# Patient Record
Sex: MALE | Race: WHITE
[De-identification: names, ages, dates, MRNs, and addresses within clinical notes are randomized per-mention and may not be internally consistent; named-entity substitution may affect disease eponyms.]

---

## 2018-09-24 ENCOUNTER — HOSPITAL ENCOUNTER (EMERGENCY)
Dept: HOSPITAL 52 - LL.ED | Age: 53
Discharge: HOME | End: 2018-09-24
Payer: MEDICAID

## 2018-09-24 VITALS — SYSTOLIC BLOOD PRESSURE: 113 MMHG | DIASTOLIC BLOOD PRESSURE: 84 MMHG

## 2018-09-24 DIAGNOSIS — F17.210: ICD-10-CM

## 2018-09-24 DIAGNOSIS — M15.0: ICD-10-CM

## 2018-09-24 DIAGNOSIS — J43.1: ICD-10-CM

## 2018-09-24 DIAGNOSIS — Z79.899: ICD-10-CM

## 2018-09-24 DIAGNOSIS — K21.9: ICD-10-CM

## 2018-09-24 DIAGNOSIS — Z99.81: ICD-10-CM

## 2018-09-24 DIAGNOSIS — F32.9: ICD-10-CM

## 2018-09-24 DIAGNOSIS — R55: ICD-10-CM

## 2018-09-24 DIAGNOSIS — F41.8: ICD-10-CM

## 2018-09-24 DIAGNOSIS — E78.00: ICD-10-CM

## 2018-09-24 DIAGNOSIS — I10: ICD-10-CM

## 2018-09-24 DIAGNOSIS — R41.0: Primary | ICD-10-CM

## 2018-09-24 DIAGNOSIS — Z71.6: ICD-10-CM

## 2018-09-24 DIAGNOSIS — E03.9: ICD-10-CM

## 2018-09-24 DIAGNOSIS — E78.5: ICD-10-CM

## 2018-09-24 DIAGNOSIS — F41.9: ICD-10-CM

## 2018-09-24 LAB
CHLORIDE SERPL-SCNC: 105 MMOL/L (ref 98–107)
SODIUM SERPL-SCNC: 140 MMOL/L (ref 136–145)

## 2018-09-24 PROCEDURE — G0480 DRUG TEST DEF 1-7 CLASSES: HCPCS

## 2019-01-12 ENCOUNTER — HOSPITAL ENCOUNTER (INPATIENT)
Dept: HOSPITAL 52 - LL.ED | Age: 54
LOS: 3 days | Discharge: PSYCHIATRIC OR SUBSTANCE USE FACILITY | DRG: 880 | End: 2019-01-15
Attending: FAMILY MEDICINE | Admitting: FAMILY MEDICINE
Payer: MEDICAID

## 2019-01-12 DIAGNOSIS — F41.8: Primary | ICD-10-CM

## 2019-01-12 DIAGNOSIS — E78.5: ICD-10-CM

## 2019-01-12 DIAGNOSIS — M54.2: ICD-10-CM

## 2019-01-12 DIAGNOSIS — E66.9: ICD-10-CM

## 2019-01-12 DIAGNOSIS — G47.00: ICD-10-CM

## 2019-01-12 DIAGNOSIS — G89.29: ICD-10-CM

## 2019-01-12 DIAGNOSIS — E87.6: ICD-10-CM

## 2019-01-12 DIAGNOSIS — Z79.899: ICD-10-CM

## 2019-01-12 DIAGNOSIS — K21.9: ICD-10-CM

## 2019-01-12 DIAGNOSIS — Z81.8: ICD-10-CM

## 2019-01-12 DIAGNOSIS — E78.00: ICD-10-CM

## 2019-01-12 DIAGNOSIS — F10.21: ICD-10-CM

## 2019-01-12 DIAGNOSIS — L40.9: ICD-10-CM

## 2019-01-12 DIAGNOSIS — F17.210: ICD-10-CM

## 2019-01-12 DIAGNOSIS — I10: ICD-10-CM

## 2019-01-12 DIAGNOSIS — E03.9: ICD-10-CM

## 2019-01-12 DIAGNOSIS — J43.1: ICD-10-CM

## 2019-01-12 DIAGNOSIS — Z91.5: ICD-10-CM

## 2019-01-12 DIAGNOSIS — M54.9: ICD-10-CM

## 2019-01-12 DIAGNOSIS — B19.10: ICD-10-CM

## 2019-01-12 DIAGNOSIS — R45.851: ICD-10-CM

## 2019-01-12 LAB
CHLORIDE SERPL-SCNC: 100 MMOL/L (ref 98–107)
SODIUM SERPL-SCNC: 136 MMOL/L (ref 136–145)

## 2019-01-12 PROCEDURE — 83605 ASSAY OF LACTIC ACID: CPT

## 2019-01-12 PROCEDURE — 71046 X-RAY EXAM CHEST 2 VIEWS: CPT

## 2019-01-12 PROCEDURE — 99285 EMERGENCY DEPT VISIT HI MDM: CPT

## 2019-01-12 PROCEDURE — 36415 COLL VENOUS BLD VENIPUNCTURE: CPT

## 2019-01-12 PROCEDURE — 96361 HYDRATE IV INFUSION ADD-ON: CPT

## 2019-01-12 PROCEDURE — 83735 ASSAY OF MAGNESIUM: CPT

## 2019-01-12 PROCEDURE — 81001 URINALYSIS AUTO W/SCOPE: CPT

## 2019-01-12 PROCEDURE — 80305 DRUG TEST PRSMV DIR OPT OBS: CPT

## 2019-01-12 PROCEDURE — 85025 COMPLETE CBC W/AUTO DIFF WBC: CPT

## 2019-01-12 PROCEDURE — 84443 ASSAY THYROID STIM HORMONE: CPT

## 2019-01-12 PROCEDURE — 82140 ASSAY OF AMMONIA: CPT

## 2019-01-12 PROCEDURE — 80053 COMPREHEN METABOLIC PANEL: CPT

## 2019-01-12 PROCEDURE — G0480 DRUG TEST DEF 1-7 CLASSES: HCPCS

## 2019-01-12 PROCEDURE — C9113 INJ PANTOPRAZOLE SODIUM, VIA: HCPCS

## 2019-01-12 RX ADMIN — MOMETASONE FUROATE AND FORMOTEROL FUMARATE DIHYDRATE SCH PUFF: 200; 5 AEROSOL RESPIRATORY (INHALATION) at 19:36

## 2019-01-12 RX ADMIN — Medication PRN ML: at 16:32

## 2019-01-12 RX ADMIN — LITHIUM CARBONATE SCH MG: 450 TABLET, EXTENDED RELEASE ORAL at 19:39

## 2019-01-12 RX ADMIN — ONDANSETRON PRN MG: 4 TABLET, ORALLY DISINTEGRATING ORAL at 19:43

## 2019-01-12 RX ADMIN — POTASSIUM CHLORIDE SCH MEQ: 1500 TABLET, EXTENDED RELEASE ORAL at 17:58

## 2019-01-13 LAB
CHLORIDE SERPL-SCNC: 106 MMOL/L (ref 98–107)
SODIUM SERPL-SCNC: 140 MMOL/L (ref 136–145)

## 2019-01-13 RX ADMIN — ONDANSETRON PRN MG: 4 TABLET, ORALLY DISINTEGRATING ORAL at 05:35

## 2019-01-13 RX ADMIN — POTASSIUM CHLORIDE SCH MEQ: 1500 TABLET, EXTENDED RELEASE ORAL at 07:22

## 2019-01-13 RX ADMIN — MOMETASONE FUROATE AND FORMOTEROL FUMARATE DIHYDRATE SCH PUFF: 200; 5 AEROSOL RESPIRATORY (INHALATION) at 19:40

## 2019-01-13 RX ADMIN — TIOTROPIUM BROMIDE SCH DOSE: 18 CAPSULE ORAL; RESPIRATORY (INHALATION) at 07:23

## 2019-01-13 RX ADMIN — LITHIUM CARBONATE SCH MG: 450 TABLET, EXTENDED RELEASE ORAL at 19:40

## 2019-01-13 RX ADMIN — MOMETASONE FUROATE AND FORMOTEROL FUMARATE DIHYDRATE SCH PUFF: 200; 5 AEROSOL RESPIRATORY (INHALATION) at 07:21

## 2019-01-13 RX ADMIN — POTASSIUM CHLORIDE SCH MEQ: 1500 TABLET, EXTENDED RELEASE ORAL at 17:13

## 2019-01-14 RX ADMIN — MOMETASONE FUROATE AND FORMOTEROL FUMARATE DIHYDRATE SCH PUFF: 200; 5 AEROSOL RESPIRATORY (INHALATION) at 08:43

## 2019-01-14 RX ADMIN — Medication PRN ML: at 16:17

## 2019-01-14 RX ADMIN — TIOTROPIUM BROMIDE SCH DOSE: 18 CAPSULE ORAL; RESPIRATORY (INHALATION) at 08:43

## 2019-01-14 RX ADMIN — LITHIUM CARBONATE SCH MG: 450 TABLET, EXTENDED RELEASE ORAL at 19:51

## 2019-01-14 RX ADMIN — POTASSIUM CHLORIDE SCH MEQ: 1500 TABLET, EXTENDED RELEASE ORAL at 17:09

## 2019-01-14 RX ADMIN — OMEPRAZOLE SCH MG: 20 CAPSULE, DELAYED RELEASE ORAL at 08:34

## 2019-01-14 RX ADMIN — POTASSIUM CHLORIDE SCH MEQ: 1500 TABLET, EXTENDED RELEASE ORAL at 08:35

## 2019-01-14 RX ADMIN — MOMETASONE FUROATE AND FORMOTEROL FUMARATE DIHYDRATE SCH PUFF: 200; 5 AEROSOL RESPIRATORY (INHALATION) at 19:52

## 2019-01-14 RX ADMIN — ONDANSETRON PRN MG: 4 TABLET, ORALLY DISINTEGRATING ORAL at 05:51

## 2019-01-14 RX ADMIN — OMEPRAZOLE SCH MG: 20 CAPSULE, DELAYED RELEASE ORAL at 17:10

## 2019-01-14 RX ADMIN — OMEPRAZOLE SCH: 20 CAPSULE, DELAYED RELEASE ORAL at 00:30

## 2019-01-15 VITALS — DIASTOLIC BLOOD PRESSURE: 78 MMHG | SYSTOLIC BLOOD PRESSURE: 158 MMHG

## 2019-01-15 RX ADMIN — POTASSIUM CHLORIDE SCH MEQ: 1500 TABLET, EXTENDED RELEASE ORAL at 09:01

## 2019-01-15 RX ADMIN — ONDANSETRON PRN MG: 4 TABLET, ORALLY DISINTEGRATING ORAL at 13:17

## 2019-01-15 RX ADMIN — ONDANSETRON PRN MG: 4 TABLET, ORALLY DISINTEGRATING ORAL at 08:03

## 2019-01-15 RX ADMIN — TIOTROPIUM BROMIDE SCH DOSE: 18 CAPSULE ORAL; RESPIRATORY (INHALATION) at 09:02

## 2019-01-15 RX ADMIN — MOMETASONE FUROATE AND FORMOTEROL FUMARATE DIHYDRATE SCH PUFF: 200; 5 AEROSOL RESPIRATORY (INHALATION) at 09:01

## 2019-01-15 RX ADMIN — OMEPRAZOLE SCH MG: 20 CAPSULE, DELAYED RELEASE ORAL at 09:00

## 2019-02-15 ENCOUNTER — HOSPITAL ENCOUNTER (EMERGENCY)
Dept: HOSPITAL 52 - LL.ED | Age: 54
Discharge: HOME | End: 2019-02-15
Payer: MEDICARE

## 2019-02-15 VITALS — SYSTOLIC BLOOD PRESSURE: 148 MMHG | DIASTOLIC BLOOD PRESSURE: 87 MMHG

## 2019-02-15 DIAGNOSIS — K29.70: Primary | ICD-10-CM

## 2019-02-15 DIAGNOSIS — Z79.899: ICD-10-CM

## 2019-02-15 LAB
CHLORIDE SERPL-SCNC: 102 MMOL/L (ref 98–107)
SODIUM SERPL-SCNC: 139 MMOL/L (ref 136–145)

## 2019-05-08 ENCOUNTER — HOSPITAL ENCOUNTER (EMERGENCY)
Dept: HOSPITAL 52 - LL.ED | Age: 54
Discharge: HOME | End: 2019-05-08
Payer: MEDICARE

## 2019-05-08 VITALS — SYSTOLIC BLOOD PRESSURE: 138 MMHG | DIASTOLIC BLOOD PRESSURE: 72 MMHG

## 2019-05-08 DIAGNOSIS — K21.9: ICD-10-CM

## 2019-05-08 DIAGNOSIS — E03.9: ICD-10-CM

## 2019-05-08 DIAGNOSIS — F41.9: ICD-10-CM

## 2019-05-08 DIAGNOSIS — R79.89: ICD-10-CM

## 2019-05-08 DIAGNOSIS — Z79.899: ICD-10-CM

## 2019-05-08 DIAGNOSIS — K52.9: Primary | ICD-10-CM

## 2019-05-08 DIAGNOSIS — I10: ICD-10-CM

## 2019-05-08 DIAGNOSIS — E78.00: ICD-10-CM

## 2019-05-08 DIAGNOSIS — F32.9: ICD-10-CM

## 2019-05-08 DIAGNOSIS — F17.200: ICD-10-CM

## 2019-05-08 DIAGNOSIS — J44.9: ICD-10-CM

## 2019-05-08 LAB
CHLORIDE SERPL-SCNC: 99 MMOL/L (ref 98–107)
SODIUM SERPL-SCNC: 138 MMOL/L (ref 136–145)

## 2019-05-08 PROCEDURE — C9113 INJ PANTOPRAZOLE SODIUM, VIA: HCPCS

## 2019-05-08 RX ADMIN — Medication PRN ML: at 11:26

## 2019-05-08 RX ADMIN — Medication PRN ML: at 10:46

## 2019-08-25 ENCOUNTER — HOSPITAL ENCOUNTER (EMERGENCY)
Dept: HOSPITAL 52 - LL.ED | Age: 54
Discharge: HOME | End: 2019-08-25
Payer: MEDICARE

## 2019-08-25 VITALS — DIASTOLIC BLOOD PRESSURE: 83 MMHG | SYSTOLIC BLOOD PRESSURE: 141 MMHG

## 2019-08-25 DIAGNOSIS — S50.821A: Primary | ICD-10-CM

## 2019-08-25 DIAGNOSIS — F41.9: ICD-10-CM

## 2019-08-25 DIAGNOSIS — F17.210: ICD-10-CM

## 2019-08-25 DIAGNOSIS — K21.9: ICD-10-CM

## 2019-08-25 DIAGNOSIS — E78.00: ICD-10-CM

## 2019-08-25 DIAGNOSIS — F32.9: ICD-10-CM

## 2019-08-25 DIAGNOSIS — I10: ICD-10-CM

## 2019-08-25 DIAGNOSIS — J44.9: ICD-10-CM

## 2019-08-25 DIAGNOSIS — Z79.899: ICD-10-CM

## 2019-08-25 DIAGNOSIS — E03.9: ICD-10-CM
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
## 2019-11-09 ENCOUNTER — HOSPITAL ENCOUNTER (EMERGENCY)
Dept: HOSPITAL 52 - LL.ED | Age: 54
Discharge: HOME | End: 2019-11-09
Payer: MEDICARE

## 2019-11-09 VITALS — HEART RATE: 102 BPM | DIASTOLIC BLOOD PRESSURE: 89 MMHG | SYSTOLIC BLOOD PRESSURE: 129 MMHG

## 2019-11-09 DIAGNOSIS — F17.210: ICD-10-CM

## 2019-11-09 DIAGNOSIS — F41.9: ICD-10-CM
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
## 2019-11-09 DIAGNOSIS — J44.9: ICD-10-CM

## 2019-11-09 DIAGNOSIS — Z79.899: ICD-10-CM
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
## 2019-11-09 DIAGNOSIS — F10.220: ICD-10-CM

## 2019-11-09 DIAGNOSIS — F32.9: ICD-10-CM

## 2019-11-09 DIAGNOSIS — S39.92XA: ICD-10-CM

## 2019-11-09 DIAGNOSIS — E87.6: ICD-10-CM

## 2019-11-09 DIAGNOSIS — Z79.890: ICD-10-CM

## 2019-11-09 DIAGNOSIS — I10: ICD-10-CM

## 2019-11-09 DIAGNOSIS — E03.9: ICD-10-CM

## 2019-11-09 DIAGNOSIS — E66.9: ICD-10-CM

## 2019-11-09 DIAGNOSIS — S70.12XA: Primary | ICD-10-CM

## 2019-11-09 DIAGNOSIS — Z79.51: ICD-10-CM

## 2019-11-09 DIAGNOSIS — S00.81XA: ICD-10-CM

## 2019-11-09 DIAGNOSIS — K21.9: ICD-10-CM
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
## 2019-11-09 LAB
CHLORIDE SERPL-SCNC: 108 MMOL/L (ref 98–107)
SODIUM SERPL-SCNC: 146 MMOL/L (ref 136–145)

## 2019-11-09 PROCEDURE — G0480 DRUG TEST DEF 1-7 CLASSES: HCPCS

## 2019-11-09 RX ADMIN — Medication PRN ML: at 03:26

## 2019-11-09 RX ADMIN — POTASSIUM CHLORIDE ONE MEQ: 1500 TABLET, EXTENDED RELEASE ORAL at 03:25

## 2019-11-12 ENCOUNTER — HOSPITAL ENCOUNTER (EMERGENCY)
Dept: HOSPITAL 52 - LL.ED | Age: 54
Discharge: LEFT BEFORE DISCHARGE | End: 2019-11-12
Payer: MEDICARE

## 2019-11-12 VITALS — HEART RATE: 101 BPM | SYSTOLIC BLOOD PRESSURE: 144 MMHG | DIASTOLIC BLOOD PRESSURE: 107 MMHG

## 2019-11-12 DIAGNOSIS — J44.9: ICD-10-CM

## 2019-11-12 DIAGNOSIS — E03.9: ICD-10-CM

## 2019-11-12 DIAGNOSIS — E66.9: ICD-10-CM

## 2019-11-12 DIAGNOSIS — I10: ICD-10-CM

## 2019-11-12 DIAGNOSIS — F41.9: ICD-10-CM

## 2019-11-12 DIAGNOSIS — F10.120: Primary | ICD-10-CM

## 2019-11-12 DIAGNOSIS — K21.9: ICD-10-CM

## 2019-11-12 DIAGNOSIS — E78.00: ICD-10-CM

## 2019-11-12 DIAGNOSIS — F32.9: ICD-10-CM

## 2019-11-12 LAB
BARBITURATES UR QL SCN: NEGATIVE
BENZODIAZ UR QL SCN: NEGATIVE
CHLORIDE SERPL-SCNC: 111 MMOL/L (ref 98–107)
SODIUM SERPL-SCNC: 147 MMOL/L (ref 136–145)
TCA UR-MCNC: NEGATIVE UG/ML
THC UR QL SCN>50 NG/ML: POSITIVE

## 2019-11-12 PROCEDURE — 99282 EMERGENCY DEPT VISIT SF MDM: CPT

## 2019-11-12 PROCEDURE — 99284 EMERGENCY DEPT VISIT MOD MDM: CPT

## 2019-11-12 PROCEDURE — 80053 COMPREHEN METABOLIC PANEL: CPT

## 2019-11-12 PROCEDURE — G0480 DRUG TEST DEF 1-7 CLASSES: HCPCS

## 2019-11-12 PROCEDURE — 36415 COLL VENOUS BLD VENIPUNCTURE: CPT

## 2019-11-12 PROCEDURE — 85025 COMPLETE CBC W/AUTO DIFF WBC: CPT

## 2019-11-12 PROCEDURE — 80305 DRUG TEST PRSMV DIR OPT OBS: CPT

## 2019-11-13 ENCOUNTER — HOSPITAL ENCOUNTER (OUTPATIENT)
Dept: HOSPITAL 52 - LL.CLIN | Age: 54
End: 2019-11-13
Payer: MEDICARE

## 2019-11-13 DIAGNOSIS — M25.861: ICD-10-CM

## 2019-11-13 DIAGNOSIS — Z01.818: Primary | ICD-10-CM

## 2019-11-13 DIAGNOSIS — M25.569: ICD-10-CM

## 2019-11-13 DIAGNOSIS — M25.862: ICD-10-CM

## 2019-11-13 DIAGNOSIS — M76.899: ICD-10-CM

## 2019-12-09 ENCOUNTER — HOSPITAL ENCOUNTER (INPATIENT)
Dept: HOSPITAL 52 - LL.MS | Age: 54
LOS: 3 days | Discharge: HOME | DRG: 897 | End: 2019-12-12
Attending: FAMILY MEDICINE | Admitting: FAMILY MEDICINE
Payer: MEDICARE

## 2019-12-09 DIAGNOSIS — K21.9: ICD-10-CM

## 2019-12-09 DIAGNOSIS — I10: ICD-10-CM

## 2019-12-09 DIAGNOSIS — M17.0: ICD-10-CM

## 2019-12-09 DIAGNOSIS — F10.231: ICD-10-CM

## 2019-12-09 DIAGNOSIS — Z79.899: ICD-10-CM

## 2019-12-09 DIAGNOSIS — E03.9: ICD-10-CM

## 2019-12-09 DIAGNOSIS — E78.00: ICD-10-CM

## 2019-12-09 DIAGNOSIS — E66.9: ICD-10-CM

## 2019-12-09 DIAGNOSIS — F10.221: Primary | ICD-10-CM

## 2019-12-09 DIAGNOSIS — E86.0: ICD-10-CM

## 2019-12-09 DIAGNOSIS — F41.8: ICD-10-CM

## 2019-12-09 DIAGNOSIS — E78.2: ICD-10-CM

## 2019-12-09 DIAGNOSIS — J44.9: ICD-10-CM

## 2019-12-09 DIAGNOSIS — F17.200: ICD-10-CM

## 2019-12-09 DIAGNOSIS — F31.32: ICD-10-CM

## 2019-12-09 DIAGNOSIS — F10.239: ICD-10-CM

## 2019-12-09 DIAGNOSIS — Z71.6: ICD-10-CM

## 2019-12-09 LAB
CHLORIDE SERPL-SCNC: 109 MMOL/L (ref 98–107)
SODIUM SERPL-SCNC: 147 MMOL/L (ref 136–145)

## 2019-12-09 PROCEDURE — C9113 INJ PANTOPRAZOLE SODIUM, VIA: HCPCS

## 2019-12-09 PROCEDURE — G0480 DRUG TEST DEF 1-7 CLASSES: HCPCS

## 2019-12-09 RX ADMIN — HYDROXYZINE HYDROCHLORIDE PRN MG: 50 INJECTION, SOLUTION INTRAMUSCULAR at 15:53

## 2019-12-09 RX ADMIN — POTASSIUM CHLORIDE SCH MEQ: 1500 TABLET, EXTENDED RELEASE ORAL at 17:11

## 2019-12-09 RX ADMIN — LORAZEPAM PRN MG: 2 INJECTION INTRAMUSCULAR; INTRAVENOUS at 13:22

## 2019-12-10 LAB
CHLORIDE SERPL-SCNC: 106 MMOL/L (ref 98–107)
SODIUM SERPL-SCNC: 142 MMOL/L (ref 136–145)

## 2019-12-10 RX ADMIN — HYDROXYZINE HYDROCHLORIDE PRN MG: 50 INJECTION, SOLUTION INTRAMUSCULAR at 19:37

## 2019-12-10 RX ADMIN — POTASSIUM CHLORIDE SCH MEQ: 1500 TABLET, EXTENDED RELEASE ORAL at 07:30

## 2019-12-10 RX ADMIN — POTASSIUM CHLORIDE SCH MEQ: 1500 TABLET, EXTENDED RELEASE ORAL at 17:07

## 2019-12-10 RX ADMIN — LORAZEPAM PRN MG: 2 INJECTION INTRAMUSCULAR; INTRAVENOUS at 21:02

## 2019-12-10 RX ADMIN — Medication SCH ML: at 22:06

## 2019-12-11 LAB
CHLORIDE SERPL-SCNC: 105 MMOL/L (ref 98–107)
SODIUM SERPL-SCNC: 141 MMOL/L (ref 136–145)

## 2019-12-11 RX ADMIN — Medication SCH ML: at 19:30

## 2019-12-11 RX ADMIN — NEOMYCIN AND POLYMYXIN B SULFATES AND BACITRACIN ZINC SCH EACH: 400; 3.5; 5 OINTMENT TOPICAL at 19:31

## 2019-12-11 RX ADMIN — POTASSIUM CHLORIDE SCH MEQ: 1500 TABLET, EXTENDED RELEASE ORAL at 07:14

## 2019-12-11 RX ADMIN — Medication SCH ML: at 07:16

## 2019-12-11 RX ADMIN — POTASSIUM CHLORIDE SCH: 1500 TABLET, EXTENDED RELEASE ORAL at 17:21

## 2019-12-11 RX ADMIN — Medication SCH EA: at 07:00

## 2019-12-12 VITALS — HEART RATE: 68 BPM | SYSTOLIC BLOOD PRESSURE: 121 MMHG | DIASTOLIC BLOOD PRESSURE: 82 MMHG

## 2019-12-12 LAB
CHLORIDE SERPL-SCNC: 106 MMOL/L (ref 98–107)
SODIUM SERPL-SCNC: 142 MMOL/L (ref 136–145)

## 2019-12-12 RX ADMIN — NEOMYCIN AND POLYMYXIN B SULFATES AND BACITRACIN ZINC SCH EACH: 400; 3.5; 5 OINTMENT TOPICAL at 07:34

## 2019-12-12 RX ADMIN — Medication SCH EA: at 07:33

## 2019-12-12 RX ADMIN — Medication SCH ML: at 07:33

## 2020-01-03 ENCOUNTER — HOSPITAL ENCOUNTER (INPATIENT)
Dept: HOSPITAL 52 - LL.MS | Age: 55
LOS: 3 days | Discharge: NURSING, REHAB OR LONG-TERM CARE | DRG: 897 | End: 2020-01-06
Attending: FAMILY MEDICINE | Admitting: FAMILY MEDICINE
Payer: MEDICARE

## 2020-01-03 DIAGNOSIS — Z51.5: ICD-10-CM

## 2020-01-03 DIAGNOSIS — F10.231: Primary | ICD-10-CM

## 2020-01-03 DIAGNOSIS — K21.9: ICD-10-CM

## 2020-01-03 DIAGNOSIS — F10.229: ICD-10-CM

## 2020-01-03 DIAGNOSIS — I10: ICD-10-CM

## 2020-01-03 DIAGNOSIS — F41.8: ICD-10-CM

## 2020-01-03 DIAGNOSIS — F10.239: ICD-10-CM

## 2020-01-03 DIAGNOSIS — E03.9: ICD-10-CM

## 2020-01-03 DIAGNOSIS — F31.32: ICD-10-CM

## 2020-01-03 DIAGNOSIS — E78.2: ICD-10-CM

## 2020-01-03 DIAGNOSIS — Z71.6: ICD-10-CM

## 2020-01-03 DIAGNOSIS — B37.0: ICD-10-CM

## 2020-01-03 DIAGNOSIS — M17.0: ICD-10-CM

## 2020-01-03 DIAGNOSIS — E86.0: ICD-10-CM

## 2020-01-03 DIAGNOSIS — M05.79: ICD-10-CM

## 2020-01-03 DIAGNOSIS — J44.1: ICD-10-CM

## 2020-01-03 LAB
CHLORIDE SERPL-SCNC: 107 MMOL/L (ref 98–107)
SODIUM SERPL-SCNC: 143 MMOL/L (ref 136–145)

## 2020-01-03 PROCEDURE — G0480 DRUG TEST DEF 1-7 CLASSES: HCPCS

## 2020-01-03 RX ADMIN — LORAZEPAM PRN MG: 2 INJECTION INTRAMUSCULAR; INTRAVENOUS at 21:48

## 2020-01-03 RX ADMIN — LORAZEPAM PRN MG: 2 INJECTION INTRAMUSCULAR; INTRAVENOUS at 17:00

## 2020-01-03 RX ADMIN — Medication SCH: at 17:15

## 2020-01-03 RX ADMIN — NYSTATIN SCH ML: 500000 SUSPENSION ORAL at 19:21

## 2020-01-04 RX ADMIN — AMOXICILLIN AND CLAVULANATE POTASSIUM SCH TAB: 875; 125 TABLET, FILM COATED ORAL at 19:05

## 2020-01-04 RX ADMIN — NYSTATIN SCH ML: 500000 SUSPENSION ORAL at 12:20

## 2020-01-04 RX ADMIN — LORAZEPAM PRN MG: 2 INJECTION INTRAMUSCULAR; INTRAVENOUS at 05:02

## 2020-01-04 RX ADMIN — TIOTROPIUM BROMIDE SCH PUFF: 18 CAPSULE ORAL; RESPIRATORY (INHALATION) at 08:21

## 2020-01-04 RX ADMIN — VITAMIN D, TAB 1000IU (100/BT) SCH MCG: 25 TAB at 08:15

## 2020-01-04 RX ADMIN — ONDANSETRON PRN MG: 4 TABLET, ORALLY DISINTEGRATING ORAL at 05:03

## 2020-01-04 RX ADMIN — ONDANSETRON PRN MG: 4 TABLET, ORALLY DISINTEGRATING ORAL at 19:04

## 2020-01-04 RX ADMIN — OMEPRAZOLE SCH MG: 20 CAPSULE, DELAYED RELEASE ORAL at 08:15

## 2020-01-04 RX ADMIN — LORAZEPAM PRN MG: 2 INJECTION INTRAMUSCULAR; INTRAVENOUS at 23:28

## 2020-01-04 RX ADMIN — MOMETASONE FUROATE AND FORMOTEROL FUMARATE DIHYDRATE SCH PUFF: 200; 5 AEROSOL RESPIRATORY (INHALATION) at 08:20

## 2020-01-04 RX ADMIN — Medication SCH EA: at 17:14

## 2020-01-04 RX ADMIN — LORAZEPAM PRN MG: 2 INJECTION INTRAMUSCULAR; INTRAVENOUS at 12:28

## 2020-01-04 RX ADMIN — NYSTATIN SCH ML: 500000 SUSPENSION ORAL at 15:26

## 2020-01-04 RX ADMIN — NYSTATIN SCH ML: 500000 SUSPENSION ORAL at 19:06

## 2020-01-04 RX ADMIN — NYSTATIN SCH ML: 500000 SUSPENSION ORAL at 08:16

## 2020-01-05 LAB
BARBITURATES UR QL SCN: NEGATIVE
BENZODIAZ UR QL SCN: POSITIVE
CHLORIDE SERPL-SCNC: 107 MMOL/L (ref 98–107)
EDDP UR QL: NEGATIVE
SODIUM SERPL-SCNC: 140 MMOL/L (ref 136–145)
TCA UR-MCNC: NEGATIVE UG/ML
THC UR QL SCN>50 NG/ML: POSITIVE

## 2020-01-05 RX ADMIN — VITAMIN D, TAB 1000IU (100/BT) SCH MCG: 25 TAB at 07:54

## 2020-01-05 RX ADMIN — NYSTATIN SCH ML: 500000 SUSPENSION ORAL at 17:19

## 2020-01-05 RX ADMIN — LORAZEPAM PRN MG: 2 INJECTION INTRAMUSCULAR; INTRAVENOUS at 17:21

## 2020-01-05 RX ADMIN — AMOXICILLIN AND CLAVULANATE POTASSIUM SCH TAB: 875; 125 TABLET, FILM COATED ORAL at 07:55

## 2020-01-05 RX ADMIN — OMEPRAZOLE SCH MG: 20 CAPSULE, DELAYED RELEASE ORAL at 07:55

## 2020-01-05 RX ADMIN — AMOXICILLIN AND CLAVULANATE POTASSIUM SCH TAB: 875; 125 TABLET, FILM COATED ORAL at 19:41

## 2020-01-05 RX ADMIN — LORAZEPAM PRN MG: 2 INJECTION INTRAMUSCULAR; INTRAVENOUS at 09:46

## 2020-01-05 RX ADMIN — NYSTATIN SCH ML: 500000 SUSPENSION ORAL at 19:40

## 2020-01-05 RX ADMIN — NYSTATIN SCH ML: 500000 SUSPENSION ORAL at 07:53

## 2020-01-05 RX ADMIN — Medication SCH EA: at 17:19

## 2020-01-05 RX ADMIN — TIOTROPIUM BROMIDE SCH PUFF: 18 CAPSULE ORAL; RESPIRATORY (INHALATION) at 07:53

## 2020-01-05 RX ADMIN — MOMETASONE FUROATE AND FORMOTEROL FUMARATE DIHYDRATE SCH PUFF: 200; 5 AEROSOL RESPIRATORY (INHALATION) at 07:53

## 2020-01-05 RX ADMIN — NYSTATIN SCH ML: 500000 SUSPENSION ORAL at 11:32

## 2020-01-06 VITALS — SYSTOLIC BLOOD PRESSURE: 123 MMHG | DIASTOLIC BLOOD PRESSURE: 77 MMHG | HEART RATE: 75 BPM

## 2020-01-06 RX ADMIN — OMEPRAZOLE SCH MG: 20 CAPSULE, DELAYED RELEASE ORAL at 07:26

## 2020-01-06 RX ADMIN — NYSTATIN SCH ML: 500000 SUSPENSION ORAL at 11:16

## 2020-01-06 RX ADMIN — TIOTROPIUM BROMIDE SCH PUFF: 18 CAPSULE ORAL; RESPIRATORY (INHALATION) at 07:27

## 2020-01-06 RX ADMIN — NYSTATIN SCH ML: 500000 SUSPENSION ORAL at 07:26

## 2020-01-06 RX ADMIN — MOMETASONE FUROATE AND FORMOTEROL FUMARATE DIHYDRATE SCH PUFF: 200; 5 AEROSOL RESPIRATORY (INHALATION) at 07:26

## 2020-01-06 RX ADMIN — AMOXICILLIN AND CLAVULANATE POTASSIUM SCH TAB: 875; 125 TABLET, FILM COATED ORAL at 07:25

## 2020-01-06 RX ADMIN — VITAMIN D, TAB 1000IU (100/BT) SCH MCG: 25 TAB at 07:25

## 2020-01-21 ENCOUNTER — HOSPITAL ENCOUNTER (INPATIENT)
Dept: HOSPITAL 7 - FB.SDS | Age: 55
LOS: 3 days | Discharge: NURSING, REHAB OR LONG-TERM CARE | DRG: 554 | End: 2020-01-24
Payer: MEDICARE

## 2020-01-21 DIAGNOSIS — Z79.51: ICD-10-CM

## 2020-01-21 DIAGNOSIS — E83.42: ICD-10-CM

## 2020-01-21 DIAGNOSIS — F10.20: ICD-10-CM

## 2020-01-21 DIAGNOSIS — F32.9: ICD-10-CM

## 2020-01-21 DIAGNOSIS — F17.210: ICD-10-CM

## 2020-01-21 DIAGNOSIS — Z79.899: ICD-10-CM

## 2020-01-21 DIAGNOSIS — E03.9: ICD-10-CM

## 2020-01-21 DIAGNOSIS — M19.90: ICD-10-CM

## 2020-01-21 DIAGNOSIS — J44.9: ICD-10-CM

## 2020-01-21 DIAGNOSIS — Z79.82: ICD-10-CM

## 2020-01-21 DIAGNOSIS — D62: ICD-10-CM

## 2020-01-21 DIAGNOSIS — F10.21: ICD-10-CM

## 2020-01-21 DIAGNOSIS — E87.6: ICD-10-CM

## 2020-01-21 DIAGNOSIS — K21.9: ICD-10-CM

## 2020-01-21 DIAGNOSIS — F12.10: ICD-10-CM

## 2020-01-21 DIAGNOSIS — I48.91: ICD-10-CM

## 2020-01-21 DIAGNOSIS — E83.39: ICD-10-CM

## 2020-01-21 DIAGNOSIS — M17.12: Primary | ICD-10-CM

## 2020-01-21 DIAGNOSIS — Z79.890: ICD-10-CM

## 2020-01-21 DIAGNOSIS — J40: ICD-10-CM

## 2020-01-21 DIAGNOSIS — I10: ICD-10-CM

## 2020-01-21 DIAGNOSIS — F60.3: ICD-10-CM

## 2020-01-21 PROCEDURE — 94150 VITAL CAPACITY TEST: CPT

## 2020-01-21 PROCEDURE — 86900 BLOOD TYPING SEROLOGIC ABO: CPT

## 2020-01-21 PROCEDURE — 27487 REVISE/REPLACE KNEE JOINT: CPT

## 2020-01-21 PROCEDURE — 85730 THROMBOPLASTIN TIME PARTIAL: CPT

## 2020-01-21 PROCEDURE — 86850 RBC ANTIBODY SCREEN: CPT

## 2020-01-21 PROCEDURE — C1776 JOINT DEVICE (IMPLANTABLE): HCPCS

## 2020-01-21 PROCEDURE — 73560 X-RAY EXAM OF KNEE 1 OR 2: CPT

## 2020-01-21 PROCEDURE — 36415 COLL VENOUS BLD VENIPUNCTURE: CPT

## 2020-01-21 PROCEDURE — 85610 PROTHROMBIN TIME: CPT

## 2020-01-21 PROCEDURE — 86901 BLOOD TYPING SEROLOGIC RH(D): CPT

## 2020-01-21 PROCEDURE — C1713 ANCHOR/SCREW BN/BN,TIS/BN: HCPCS

## 2020-01-21 RX ADMIN — ACETAMINOPHEN SCH MG: 650 TABLET, FILM COATED, EXTENDED RELEASE ORAL at 20:37

## 2020-01-21 RX ADMIN — ACETAMINOPHEN SCH MG: 650 TABLET, FILM COATED, EXTENDED RELEASE ORAL at 17:02

## 2020-01-21 RX ADMIN — ROPIVACAINE HYDROCHLORIDE ONE SYRINGE: 5 INJECTION, SOLUTION EPIDURAL; INFILTRATION; PERINEURAL at 11:42

## 2020-01-21 RX ADMIN — OXYCODONE HYDROCHLORIDE AND ACETAMINOPHEN PRN TAB: 5; 325 TABLET ORAL at 15:50

## 2020-01-21 RX ADMIN — ROPIVACAINE HYDROCHLORIDE ONE SYRINGE: 5 INJECTION, SOLUTION EPIDURAL; INFILTRATION; PERINEURAL at 11:39

## 2020-01-21 RX ADMIN — TRANEXAMIC ACID ONE IRR: 100 INJECTION, SOLUTION INTRAVENOUS at 11:43

## 2020-01-21 RX ADMIN — ACETAMINOPHEN SCH MG: 650 TABLET, FILM COATED, EXTENDED RELEASE ORAL at 13:40

## 2020-01-21 RX ADMIN — TRANEXAMIC ACID ONE IRR: 100 INJECTION, SOLUTION INTRAVENOUS at 11:44

## 2020-01-21 RX ADMIN — OXYCODONE HYDROCHLORIDE AND ACETAMINOPHEN PRN TAB: 5; 325 TABLET ORAL at 20:46

## 2020-01-21 NOTE — PCM.CONS
H&P History of Present Illness





- General


Date of Service: 01/21/20


Admit Problem/Dx: 


 Admission Diagnosis/Problem





Admission Diagnosis/Problem      Knee joint operation








Source of Information: Patient, Old Records


History Limitations: Reports: No Limitations





- History of Present Illness


Initial Comments - Free Text/Narative: 


This is a 54-year-old male patient that Dr. Ureña wanted a consultation regards 

to his chronic medical problems. He has many medical problems including history 

of alcohol dependence, borderline personality, cannabis abuse, smoking, asthma, 

hypertension, A. fib are the most critical once. Patient had a left TKA today. 

He has no concerns. He says his been sober for 2 months currently but he 

continues to smoke. He denies any fevers, chills, chest pain, shortness of 

breath, palpitations. He's had suicidal attempts in the past but he says he 

stable this time on his psych medicines. He does see psychiatry.





  ** BIBI KNEE


Pain Score (Numeric/FACES): 3





- Related Data


Allergies/Adverse Reactions: 


 Allergies











Allergy/AdvReac Type Severity Reaction Status Date / Time


 


No Known Allergies Allergy   Verified 01/21/20 07:47











Home Medications: 


 Home Meds





Acetaminophen [Tylenol Arthritis] 650 mg PO QID 01/20/20 [History]


Budesonide/Formoterol Fumarate [Symbicort 160-4.5 Mcg Inhaler] 2 puff INH DAILY 

01/20/20 [History]


Cholecalciferol (Vitamin D3) [Vitamin D3] 4,000 unit PO DAILY 01/20/20 [History]


Gemfibrozil [Lopid] 600 mg PO DAILY 01/20/20 [History]


Glucosamine/Chondro Jackson A [Glucosamine-Chondroitin Tab] 1 each PO BID 01/20/20 [

History]


Levothyroxine 25 mcg PO ACBREAKFAST 01/20/20 [History]


Methotrexate 20 mg PO WE 01/20/20 [History]


Metoprolol Succinate [Toprol XL] 25 mg PO DAILY 01/20/20 [History]


Nicotine [Nicoderm CQ] 42 mg TD DAILY 01/20/20 [History]


Omeprazole 40 mg PO DAILY 01/20/20 [History]


QUEtiapine [SEROquel] 100 mg PO TID 01/20/20 [History]


Sertraline [Zoloft] 200 mg PO DAILY 01/20/20 [History]


Tiotropium [Spiriva] 18 mcg INH DAILY 01/20/20 [History]


Vitamin B Complex [B Complex] 1 each PO DAILY 01/20/20 [History]


Zolpidem Tartrate [Ambien] 10 mg PO BEDTIME 01/20/20 [History]


atoMOXetine [Strattera] 60 mg PO DAILY 01/20/20 [History]


rOPINIRole [Requip] 1 mg PO BEDTIME 01/20/20 [History]











Past Medical History


Cardiovascular History: Reports: Afib, Hypertension


Other Cardiovascular History: CARDIOVERSION


Respiratory History: Reports: Asthma


Gastrointestinal History: Reports: GERD


Musculoskeletal History: Reports: Arthritis


Psychiatric History: Reports: Addiction, Depression


Other Psychiatric History: ALCOHOL ABUSE, ALCOHOL WITHDRAWAL, CANNABIS ABUSE


Endocrine/Metabolic History: Reports: Hypokalemia, Hypomagnesemia, 

Hypothyroidism


Other Endocrine/Metabolic History: HYPOPHOSPHATEMIA





Social & Family History





- Tobacco Use


Smoking Status *Q: Current Every Day Smoker


Years of Tobacco use: 47


Packs/Tins Daily: 0.5





- Caffeine Use


Caffeine Use: Reports: Coffee, Tea





- Recreational Drug Use


Recreational Drug Use: Yes


Recreational Drug Type: Reports: Marijuana/Hashish


Recreational Drug Use Frequency: Weekly


Recreational Drug Last Use: LAST WEEK





H&P Review of Systems





- Review of Systems:


Review Of Systems: See Below


General: Reports: No Symptoms


HEENT: Reports: No Symptoms


Pulmonary: Reports: No Symptoms


Cardiovascular: Reports: No Symptoms


Gastrointestinal: Reports: No Symptoms


Genitourinary: Reports: No Symptoms


Musculoskeletal: Reports: Joint Pain


Skin: Reports: No Symptoms


Psychiatric: Reports: No Symptoms


Neurological: Reports: No Symptoms


Hematologic/Lymphatic: Reports: No Symptoms


Immunologic: Reports: No Symptoms





Exam





- Exam


Exam: See Below





- Vital Signs


Vital Signs: 


 Last Vital Signs











Temp  98.7 F   01/21/20 11:28


 


Pulse  92   01/21/20 11:56


 


Resp  17   01/21/20 11:56


 


BP  98/62   01/21/20 11:56


 


Pulse Ox  95   01/21/20 11:56











Weight: 154 lb 1.65 oz





- Exam


General: Alert, Oriented, Cooperative


HEENT: Hearing Intact, Posterior Pharynx Clear, TMs Clear


Neck: Supple, Trachea Midline, 2


Lungs: Clear to Auscultation, Normal Respiratory Effort.  No: Crackles, Rales, 

Rhonchi


Cardiovascular: Regular Rate, Regular Rhythm.  No: Systolic Murmur


GI/Abdominal Exam: Normal Bowel Sounds, Soft, Non-Tender, No Organomegaly, No 

Distention, No Abnormal Bruit, No Mass


Extremities: No Pedal Edema


Skin: Warm, Dry, Intact


Neurological: Normal Speech, Normal Tone


Neuro Extensive - Mental Status: Alert, Oriented x3, Normal Mood/Affect, Normal 

Cognition, Memory Intact


Psychiatric: Alert, Normal Affect, Normal Mood





- Patient Data


Lab Results Last 24 hrs: 


 Laboratory Results - last 24 hr











  01/21/20 01/21/20 Range/Units





  07:50 07:50 


 


PT  9.7   (8.7-11.1)  


 


INR  1.00   (0.89-1.13)  


 


APTT  24.5   (24.4-33.2)  SECONDS


 


Blood Type   B POSITIVE  


 


Gel Antibody Screen   Negative  














Sepsis Event Note





- Focused Exam


Vital Signs: 


 Vital Signs











  Temp Temp Pulse Resp BP BP BP


 


 01/21/20 11:56    92  17    98/62


 


 01/21/20 11:50    72  14    99/72


 


 01/21/20 11:45    93  16    92/75


 


 01/21/20 11:42      95/73  


 


 01/21/20 11:40    99  18    95/73 01/21/20 11:39      95/73  


 


 01/21/20 11:35    97  12   93/70  93/70


 


 01/21/20 11:28   98.7 F  105 H  13    93/64


 


 01/21/20 08:02  99.0 F   60  16   135/85 














  Pulse Ox


 


 01/21/20 11:56  95


 


 01/21/20 11:50  95


 


 01/21/20 11:45  95


 


 01/21/20 11:42 


 


 01/21/20 11:40  96


 


 01/21/20 11:39 


 


 01/21/20 11:35  95


 


 01/21/20 11:28  95


 


 01/21/20 08:02  97











Date Exam was Performed: 01/21/20


Time Exam was Performed: 12:58





Consult PN Assessment/Plan


(1) S/P total knee arthroplasty


SNOMED Code(s): 7986326894900, 207104311, 8826436242249


   Code(s): Z96.659 - PRESENCE OF UNSPECIFIED ARTIFICIAL KNEE JOINT   Current 

Visit: Yes   





(2) Atrial fibrillation


SNOMED Code(s): 34819080


   Code(s): I48.91 - UNSPECIFIED ATRIAL FIBRILLATION   Current Visit: Yes   





(3) Hypertension


SNOMED Code(s): 61630433


   Code(s): I10 - ESSENTIAL (PRIMARY) HYPERTENSION   Current Visit: Yes   





(4) History of alcohol dependence


SNOMED Code(s): 403326125


   Code(s): F10.21 - ALCOHOL DEPENDENCE, IN REMISSION   Current Visit: Yes   





(5) Depression


SNOMED Code(s): 47766740


   Code(s): F32.9 - MAJOR DEPRESSIVE DISORDER, SINGLE EPISODE, UNSPECIFIED   

Current Visit: Yes   





(6) Asthma


SNOMED Code(s): 538086961


   Code(s): J45.909 - UNSPECIFIED ASTHMA, UNCOMPLICATED   Current Visit: Yes   


Problem List Initiated/Reviewed/Updated: Yes


Plan: 


1. I will follow along and watch his blood pressure, asthma, A. fib and 

depression.


2. Psych medications are only ordered which is a good thing.


3. Incentive spirometry and good pulmonary toilet.


4. Continue his antihypertensive medications.


5. Patient states she's been sober for 4 months. But we'll still watch for any 

signs of withdrawal.

## 2020-01-21 NOTE — CR
INDICATION:  Post-op total left arthroplasty.  



LEFT KNEE:  Frontal and lateral views of the left knee were obtained 01/21/20 - 
no comparisons available at time of dictation.  



Metallic clips are noted anteriorly for skin closure.  



Total knee arthroplasty components appear to be in good position and alignment, 
without evidence of a complicating process.  



MTDD

## 2020-01-21 NOTE — OR
DATE OF OPERATION:  01/21/2020

 

SURGEON:  Jerzy Ureña DO

 

PREOPERATIVE DIAGNOSIS:  Left knee primary osteoarthritis.

 

POSTOPERATIVE DIAGNOSIS:  Left knee primary osteoarthritis.

 

PROCEDURE:  Left knee total knee arthroplasty.

 

ASSISTANT:  Beatrice Tenorio NP.  Nurse practitioner, Beatrice Tenorio NP,

played an essential role in assisting in this case, helping to position the

patient, retract structures as needed, as well as suturing and cutting sutures

as indicated.  Her presence improved patient's safety and decreased operative

time.

 

ANESTHESIA:  Spinal plus conscious sedation.

 

FLUID:  Lactated Ringer solution.

 

ESTIMATED BLOOD LOSS:  500 mL.

 

COMPLICATIONS:  None.

 

SPECIMEN:  None.

 

DISCHARGE DISPOSITION:  Stable to PACU.

 

INSTRUMENTATION:  DePuy size 7 femur; size 7 tibia; size 7, 5 mm polyethylene

tibial insert; and 38 mm polyethylene patella.

 

HISTORY AND INDICATIONS FOR THE PROCEDURE:  The patient was seen preoperatively

in [place].  He had failed years of nonoperative treatment and was in extreme

pain.  Preoperative imaging confirmed the above-mentioned diagnosis.  Risks and

benefits of procedure explained to the patient.  Informed consent was obtained.

 

DETAILS OF PROCEDURE:  The patient was seen preoperatively by myself and the

Anesthesia staff in the preoperative holding area where the operative site was

marked.  He was brought to the operative suite by Anesthesia staff where a

spinal sedation was administered plus conscious sedation.  All extremities were

found to be well padded.  A well-padded tourniquet was placed onto his left

thigh.  The left lower extremity was then prepped and draped in a sterile

manner.  Time-out was called identifying the correct patient, correct procedure,

and the correct site and antibiotics had begun within appropriate period of

time.

 

The left lower extremity was exsanguinated.  Tourniquet was raised to 250 mmHg

and taken down at 41 minutes during cementing.  The midline incision was made

from tibial tubercle to 3 fingerbreadths proximal to the patella.  The medial

parapatellar arthrotomy was then made.  The medial proximal tibia was exposed

using Bovie electrocautery.  Hemostasis was obtained using Bovie electrocautery

during the case.  A full synovectomy was performed.  The patella was everted and

the knee flexed.  We then removed any infrapatellar fat pad remaining and then

made two perpendicular saw cuts along the patella and this measured a 38 in

extension and then we did 3 lug holes and then placed the patella trial.  We

then flexed the knee again and then reamed the distal femur and then put an

intramedullary medullary guide in a 3 degree valgus, 9 mm distal cut, pinned

this in place and then made our distal cut to remove the guide.  I then used the

posterior condylar guide with the knee hyperflexed.  This measured a 7.  I

pinned this in place, removed the guide, and then put the chamfer block over my

pins and held it in place with 2 extra threaded pins.  We then made our anterior-

posterior chamfer cuts and then removed our chamfer block.  I then used my notch

guide anteriorly and then used a rasp to conform the femur.  I then removed the

anterior portion of the medial and lateral meniscus and the anterior cruciate

ligament, and then using a posterior blunt Hohmann and 2 sharp Hohmann to

protect the medial collateral ligaments, anteriorized the tibia, and then used

an extramedullary guide in line with the tibial spine and second metatarsal for

my tibial cut.  I pinned this in place and then made my proximal tibial cut.  I

then removed the bone and then removed my guide.  I then used a laminar 

in flexion and then removed the posterior portion of the medial and lateral

meniscus as well as posterior osteophytes.  After this had been accomplished, we

then anteriorized the tibia again and protected all our structures with Hohmann

and placed a 7 baseplate on.  We placed the tower on and then reamed and tamped

the baseplate into place.  I then placed my femur and then drilled the lugs.  I

then inserted a size 7, 5 mm polyethylene trial.  This provided excellent

stability throughout range of motion with no mid flexion instability.  We then

removed all of our components, copiously irrigated with saline, dried with laps

and then cemented our components in place in full extension with size 7, 5 mm

polyethylene trial in slightly internal rotation.  It was obvious that this

patient was going to bleed.  I did expect this and this was entirely all small

bleeders.  His platelets were good as well as hemoglobin, but as he does have a

history of long-term alcoholism and I believe this may have contributed to the

bleeding.  I packed this with laps a few times and then applied Avitene which

did slow the bleeding significantly, so I applied FloSeal to the back and then 4

packets of Avitene and then compressed this for 5 minutes with sterile Ace wrap.

This significantly decreased the bleeding.  I bovied the posterior medial and

posterior lateral corner where there are typically more bleeding.  I then

removed any extra cement and then placed my final polyethylene size 7, 5 mm in

the baseplate.  This provided excellent range of motion and stability.  I then

irrigated again with pulse lavage irrigation and inspected for any bleeders.

Then, I applied 2 packets of Avitene to the posterior lateral corners and then

closed the parapatellar arthrotomy with two #5 Ethibond figure-of-eight sutures

followed by #2 Stratafix closure in a watertight manner.  I then irrigated again

with Betadine infused irrigation and then applied 1 g of vancomycin

subcutaneously and then used 0 Stratafix to close the subcutaneous layer.  We

then applied skin staples.

 

We then applied a dressing, which was a Betadine-soaked Adaptic, fluffs, ABD,

and tightly wrapped Ace wrap.  The patient was allowed to awaken from conscious

sedation and taken to the PACU in stable condition.

 

Job#: [id number]

DD: 01/21/2020 1131

DT: 01/21/2020 1302 BS/MODL

## 2020-01-21 NOTE — US
INDICATION:  Left adductor nerve block.  



ULTRASOUND/RFA GUIDANCE:  2-D real-time ultrasonic imaging was utilized for 
left adductor nerve block placement.  
[facility]

## 2020-01-21 NOTE — PCM.OPNOTE
- General Post-Op/Procedure Note


Date of Surgery/Procedure: 01/21/20


Operative Procedure(s): left tka


Pre Op Diagnosis: left knee primary oa


Post-Op Diagnosis: Same


Anesthesia Technique: Combo Spinal/Epidural


Primary Surgeon: Jerzy Ureña


Assistant: Beatrice Tenorio


EBL in mLs: 500


Complications: None


Condition: Good

## 2020-01-22 RX ADMIN — OXYCODONE HYDROCHLORIDE AND ACETAMINOPHEN PRN TAB: 5; 325 TABLET ORAL at 00:44

## 2020-01-22 RX ADMIN — VITAMIN D, TAB 1000IU (100/BT) SCH MCG: 25 TAB at 08:49

## 2020-01-22 RX ADMIN — ACETAMINOPHEN SCH MG: 650 TABLET, FILM COATED, EXTENDED RELEASE ORAL at 17:41

## 2020-01-22 RX ADMIN — OXYCODONE HYDROCHLORIDE AND ACETAMINOPHEN PRN TAB: 5; 325 TABLET ORAL at 17:41

## 2020-01-22 RX ADMIN — OXYCODONE HYDROCHLORIDE AND ACETAMINOPHEN PRN TAB: 5; 325 TABLET ORAL at 21:43

## 2020-01-22 RX ADMIN — ACETAMINOPHEN SCH MG: 650 TABLET, FILM COATED, EXTENDED RELEASE ORAL at 21:42

## 2020-01-22 RX ADMIN — Medication PRN ML: at 11:25

## 2020-01-22 RX ADMIN — OXYCODONE HYDROCHLORIDE AND ACETAMINOPHEN PRN TAB: 5; 325 TABLET ORAL at 04:55

## 2020-01-22 RX ADMIN — Medication SCH CAP: at 08:48

## 2020-01-22 RX ADMIN — Medication SCH CAP: at 21:38

## 2020-01-22 RX ADMIN — ACETAMINOPHEN SCH MG: 650 TABLET, FILM COATED, EXTENDED RELEASE ORAL at 08:49

## 2020-01-22 RX ADMIN — OXYCODONE HYDROCHLORIDE AND ACETAMINOPHEN PRN TAB: 5; 325 TABLET ORAL at 09:10

## 2020-01-22 RX ADMIN — VITAMIN C SCH EACH: TAB at 08:50

## 2020-01-22 RX ADMIN — OXYCODONE HYDROCHLORIDE AND ACETAMINOPHEN PRN TAB: 5; 325 TABLET ORAL at 13:29

## 2020-01-22 RX ADMIN — MOMETASONE FUROATE AND FORMOTEROL FUMARATE DIHYDRATE SCH PUFF: 200; 5 AEROSOL RESPIRATORY (INHALATION) at 08:49

## 2020-01-22 RX ADMIN — ACETAMINOPHEN SCH MG: 650 TABLET, FILM COATED, EXTENDED RELEASE ORAL at 13:29

## 2020-01-22 RX ADMIN — TIOTROPIUM BROMIDE SCH CAP: 18 CAPSULE ORAL; RESPIRATORY (INHALATION) at 08:50

## 2020-01-22 RX ADMIN — Medication PRN ML: at 10:15

## 2020-01-22 RX ADMIN — ONDANSETRON PRN MG: 2 INJECTION, SOLUTION INTRAMUSCULAR; INTRAVENOUS at 11:09

## 2020-01-22 RX ADMIN — ASPIRIN SCH MG: 325 TABLET, DELAYED RELEASE ORAL at 08:48

## 2020-01-22 NOTE — PCM.PN
- General Info


Date of Service: 01/22/20


Admission Dx/Problem (Free Text): 


Patient states that he coughs a little bit this morning. He always does after 

he is a cigarette. He says his left knee is hurting. He has some numbness the 

left tibia area which is new. Nice fevers or chills.





Subjective Update: 





Patient states his pain has not been well controlled so far. He has been up 

walking and thinks that has been going well. Has no other complaints today.  


Pain Score: 7





- Review of Systems


General: Reports: No Symptoms


Pulmonary: Reports: No Symptoms


Cardiovascular: Reports: No Symptoms


Gastrointestinal: Reports: No Symptoms


Skin: Reports: No Symptoms


Neurological: Reports: Numbness (Decreased sensation to soft touch along the 

anterior lower left leg (shin), is able to feel sharp stimuli to this area)


Psychiatric: Reports: No Symptoms





- Patient Data


Vitals - Most Recent: 


 Last Vital Signs











Temp  37.4 C   01/22/20 05:00


 


Pulse  92   01/22/20 08:50


 


Resp  20   01/22/20 05:00


 


BP  117/73   01/22/20 08:50


 


Pulse Ox  97   01/22/20 05:00











Weight - Most Recent: 69.9 kg


I&O - Last 24 Hours: 


 Intake & Output











 01/21/20 01/22/20 01/22/20





 22:59 06:59 14:59


 


Intake Total 2309 1272 


 


Output Total 450 200 


 


Balance 1859 1072 











Lab Results Last 24 Hours: 


 Laboratory Results - last 24 hr











  01/22/20 01/22/20 01/22/20 Range/Units





  06:16 06:16 06:16 


 


WBC   8.6   (4.5-12.0)  X10-3/uL


 


RBC   2.78 L   (4.30-5.75)  x10(6)uL


 


Hgb   7.7 L   (13.5-17.8)  g/dL


 


Hct   23.8 L   (30.0-51.3)  %


 


MCV   85.7   (80-96)  fL


 


MCH   27.8   (27.7-33.6)  pg


 


MCHC   32.5   (32.2-35.4)  g/dL


 


RDW   18.2 H   (11.5-15.5)  %


 


Plt Count   168   (125-369)  X10(3)uL


 


MPV   8.4   (7.4-10.4)  fL


 


Neut % (Auto)   73.7   (46-82)  %


 


Lymph % (Auto)   16.1   (13-37)  %


 


Mono % (Auto)   8.1   (4-12)  %


 


Eos % (Auto)   2   (1.0-5.0)  %


 


Baso % (Auto)   0   (0-2)  %


 


Neut # (Auto)   6.4   (1.6-8.3)  #


 


Lymph # (Auto)   1.4   (0.6-5.0)  #


 


Mono # (Auto)   0.7   (0.0-1.3)  #


 


Eos # (Auto)   0.1   (0.0-0.8)  #


 


Baso # (Auto)   0.0   (0.0-0.2)  #


 


PT  10.5    (8.7-11.1)  


 


INR  1.08    (0.89-1.13)  


 


APTT  24.6    (24.4-33.2)  SECONDS


 


Sodium    141  (135-145)  mmol/L


 


Potassium    4.0  (3.5-5.3)  mmol/L


 


Chloride    109  (100-110)  mmol/L


 


Carbon Dioxide    27  (21-32)  mmol/L


 


BUN    16  (7-18)  mg/dL


 


Creatinine    0.7  (0.70-1.30)  mg/dL


 


Est Cr Clr Drug Dosing    115.36  mL/min


 


Estimated GFR (MDRD)    > 60  (>60)  


 


BUN/Creatinine Ratio    22.9 H  (9-20)  


 


Glucose    111  ()  mg/dL


 


Calcium    8.3 L  (8.6-10.2)  mg/dL


 


Total Bilirubin    0.3  (0.1-1.3)  mg/dL


 


AST    19  (5-25)  IU/L


 


ALT    15  (12-36)  U/L


 


Alkaline Phosphatase    74  ()  IU/L


 


Total Protein    5.3 L  (6.0-8.0)  g/dL


 


Albumin    2.7 L  (3.5-5.2)  g/dL


 


Globulin    2.6  g/dL


 


Albumin/Globulin Ratio    1.0  











Med Orders - Current: 


 Current Medications





Acetaminophen (Tylenol Arthritis Pain)  650 mg PO QID [facility]


   Last Admin: 01/22/20 08:49 Dose:  650 mg


Aspirin (Ecotrin)  325 mg PO DAILY [facility]


   Last Admin: 01/22/20 08:48 Dose:  325 mg


Atomoxetine HCl (Strattera)  60 mg PO DAILY [facility]


Bisacodyl (Dulcolax)  10 mg PO DAILY PRN


   PRN Reason: Constipation


Cholecalciferol (Vitamin D3)  100 mcg PO DAILY [facility]


   Last Admin: 01/22/20 08:49 Dose:  100 mcg


Diazepam (Valium)  5 mg IVPUSH Q6H PRN


   PRN Reason: Spasms


Diphenhydramine HCl (Benadryl)  25 mg IVPUSH Q4H PRN


   PRN Reason: Itching


Docusate Sodium (Colace)  100 mg PO BID PRN


   PRN Reason: Constipation


Gemfibrozil (Lopid)  600 mg PO DAILY [facility]


   Last Admin: 01/22/20 08:48 Dose:  600 mg


Glucosamine/Chondroitin (Glucosamine-Chondroitin 500-400 Capsule)  1 cap PO BID 

[facility]


   Last Admin: 01/22/20 08:48 Dose:  1 cap


Lactated Ringer's (Ringers, Lactated)  1,000 mls @ 125 mls/hr IV ASDIRECTED [facility]


   Last Admin: 01/21/20 19:02 Dose:  125 mls/hr


Lactated Ringer's (Ringers, Lactated)  1,000 mls @ 75 mls/hr IV ASDIRECTED [facility]


Levothyroxine Sodium (Levothyroxine)  25 mcg PO ACBREAKFAST [facility]


   Last Admin: 01/22/20 08:47 Dose:  25 mcg


Magnesium Hydroxide (Milk Of Magnesia)  30 ml PO BID PRN


   PRN Reason: Constipation


Methotrexate (Methotrexate)  20 mg PO WE [facility]


   Last Admin: 01/22/20 08:47 Dose:  20 mg


Metoprolol Succinate (Toprol Xl)  25 mg PO DAILY [facility]


   Last Admin: 01/22/20 08:50 Dose:  25 mg


Mometasone Furoate/Formoterol Fumar (Dulera 200-5 Mcg)  2 puff IH DAILY [facility]


   Last Admin: 01/22/20 08:49 Dose:  2 puff


Morphine Sulfate (Morphine)  2 mg IVPUSH Q2H PRN


   PRN Reason: Breakthrough Pain


   Last Admin: 01/22/20 03:08 Dose:  2 mg


Multivitamins (Total B With C)  1 each PO DAILY [facility]


   Last Admin: 01/22/20 08:50 Dose:  1 each


Naloxone HCl (Narcan)  0.1 mg IVPUSH ONETIME PRN


   PRN Reason: Oversedation


Nicotine (Habitrol)  21 mg TOP DAILY [facility]


   Last Admin: 01/22/20 08:49 Dose:  21 mg


Ondansetron HCl (Zofran)  4 mg IVPUSH Q4H PRN


   PRN Reason: Nausea/Vomiting


Oxycodone/Acetaminophen (Percocet 325-5 Mg)  2 tab PO Q4H PRN


   PRN Reason: Pain (moderate 4-6)


   Last Admin: 01/22/20 09:10 Dose:  2 tab


Pantoprazole Sodium (Protonix***)  40 mg PO DAILY [facility]


   Last Admin: 01/22/20 08:48 Dose:  40 mg


Quetiapine Fumarate (Seroquel)  100 mg PO TID [facility]


   Last Admin: 01/22/20 08:48 Dose:  100 mg


Ropinirole HCl (Requip)  1 mg PO BEDTIME [facility]


   Last Admin: 01/21/20 20:35 Dose:  1 mg


Senna (Senna)  8.6 mg PO BID PRN


   PRN Reason: Constipation


Sertraline HCl (Zoloft)  200 mg PO DAILY [facility]


   Last Admin: 01/22/20 08:48 Dose:  200 mg


Sodium Chloride (Saline Flush)  10 ml FLUSH ASDIRECTED PRN


   PRN Reason: Keep Vein Open


Sodium Chloride (Saline Flush)  10 ml FLUSH ASDIRECTED PRN


   PRN Reason: Keep Vein Open


Tiotropium Bromide (Spiriva Handihaler)  0 mcg INH DAILY [facility]


   Last Admin: 01/22/20 08:50 Dose:  1 cap


Tramadol HCl (Ultram)  100 mg PO Q6H PRN


   PRN Reason: Pain (mild 1-3)


   Last Admin: 01/22/20 02:06 Dose:  100 mg


Zolpidem Tartrate (Ambien)  5 mg PO BEDTIME PRN


   PRN Reason: Sleep


   Last Admin: 01/21/20 20:45 Dose:  5 mg





Discontinued Medications





Acetaminophen (Tylenol Extra Strength)  1,000 mg PO ONETIME ONE


   Stop: 01/21/20 07:16


   Last Admin: 01/21/20 08:36 Dose:  1,000 mg


Cefazolin Sodium (Ancef)  1 gm IVPUSH ONETIME ONE


   Stop: 01/21/20 09:01


   Last Admin: 01/21/20 08:49 Dose:  1 gm


Ropivacaine 49.25 ml/Ketorolac Tromethamine 30 mg/Epinephrine HCl 0.5 mg/

Clonidine HCl 80 mcg/ Sodium Chloride 48.45 ml  0 ml INJECT ASDIRECTED ONE


   Stop: 01/21/20 09:01


   Last Admin: 01/21/20 11:42 Dose:  100 syringe


Tranexamic Acid 3,000 mg/ (Sodium Chloride 100 ml)  0 mg IRR ONETIME ONE


   Stop: 01/21/20 09:01


   Last Admin: 01/21/20 11:44 Dose:  100 irr


Gabapentin (Neurontin)  300 mg PO ONETIME ONE


   Stop: 01/21/20 07:16


   Last Admin: 01/21/20 08:36 Dose:  300 mg


Lactated Ringer's (Ringers, Lactated)  1,000 mls @ 125 mls/hr IV ASDIRECTED [facility]


   Last Admin: 01/22/20 02:34 Dose:  125 mls/hr


Lactated Ringer's (Ringers, Lactated)  500 mls @ 250 mls/hr IV BOLUS [facility]


Scopolamine (Transderm-Scop)  1.5 mg TRDERM ONETIME ONE


   Stop: 01/21/20 07:16


   Last Admin: 01/21/20 08:36 Dose:  1.5 mg


Vancomycin HCl (Vancomycin)  1 gm .XX .STK-MED ONE


   Stop: 01/21/20 09:59


   Last Admin: 01/21/20 09:58 Dose:  1 gm











- Exam


General: Alert, Oriented, Cooperative, No Acute Distress


Lungs: Normal Respiratory Effort


Cardiovascular: Regular Rate, Regular Rhythm


Extremities: Other (Normal inspection and range of motion to right leg; post-op 

pain to left knee, tenderness to soft tissue of distal thigh, minimal swelling 

under ACE wrap and moderate drainage. Range of motion and strength to foot and 

ankle on the left is strong and WNL.)


Peripheral Pulses: 1+: Posterior Tibial (L), 2+: Dorsalis Pedis (L)


Skin: Warm, Dry, Intact (incision looks good, without erythema or swelling; 

bloody drainage pattern is mostly from distal aspect of incision)


Neurological: Other (sensation altered, but intact to left lower extremity)


Psy/Mental Status: Alert, Normal Affect, Normal Mood





Sepsis Event Note





- Evaluation


Sepsis Screening Result: No Definite Risk





- Focused Exam


Vital Signs: 


 Vital Signs











  Temp Pulse Pulse Resp BP BP Pulse Ox


 


 01/22/20 08:50   92    117/73  


 


 01/22/20 05:00  37.4 C   106 H  20   111/76  97


 


 01/22/20 01:00  37.3 C   78  20   93/61  98


 


 01/21/20 22:30  37.0 C   80  18   106/63  97











Date Exam was Performed: 01/22/20


Time Exam was Performed: 12:25





- Problem List & Annotations


(1) S/P total knee arthroplasty


SNOMED Code(s): 4417706879202, 361595212, 1821921405058


   Code(s): Z96.659 - PRESENCE OF UNSPECIFIED ARTIFICIAL KNEE JOINT   Status: 

Acute   Priority: High   Current Visit: Yes   Onset Date: ~01/21/20   


Qualifiers: 


   Laterality: left   Qualified Code(s): Z96.652 - Presence of left artificial 

knee joint   





- Problem List Review


Problem List Initiated/Reviewed/Updated: Yes





- My Orders


Last 24 Hours: 


My Active Orders





01/21/20 11:44


Antiembolic Devices [RC] .Routine 


Acetaminophen/oxyCODONE [Percocet 325-5 MG]   2 tab PO Q4H PRN 


Docusate Sodium [Colace]   100 mg PO BID PRN 


Magnesium Hydroxide [Milk of Magnesia]   30 ml PO BID PRN 


Morphine   2 mg IVPUSH Q2H PRN 


Naloxone [Narcan]   0.1 mg IVPUSH ONETIME PRN 


Ondansetron [Zofran]   4 mg IVPUSH Q4H PRN 


Sennosides [Senna]   8.6 mg PO BID PRN 


Sodium Chloride 0.9% [Saline Flush]   10 ml FLUSH ASDIRECTED PRN 


Zolpidem [Ambien]   5 mg PO BEDTIME PRN 


bisacodyL [Dulcolax]   10 mg PO DAILY PRN 


diazePAM [Valium]   5 mg IVPUSH Q6H PRN 


diphenhydrAMINE [Benadryl]   25 mg IVPUSH Q4H PRN 


traMADol [Ultram]   100 mg PO Q6H PRN 





01/21/20 11:45


Patient Status [ADT] Routine 


Ambulate [RC] ASDIRECTED 


Head of Bed Elevation [RC] CONTINUOUS 


Intake and Output [RC] QSHIFT 


May Shower [RC] ASDIRECTED 


Neurovascular Check [RC] Q4HR 


Pneumonia Education [RC] UPON 


RT Incentive Spirometry [RC] Q1HWA 


Turn, Cough, Deep Breathe [RC] Q1HWA 


Up to Chair [RC] TIDMEALS 


Vital Signs [RC] PER UNIT ROUTINE 


Wound Care [RC] Q12H 


Consult to Physician [CONS] Routine 


Respiratory Care Assess and Treatment [CONS] Routine 


Convert IV to Saline Lock [OM.PC] PER UNIT ROUTINE 


DVT/VTE Prophylaxis Reflex [OM.PC] Routine 


Ice Therapy [OM.PC] PER UNIT ROUTINE 


Oral Care [OM.PC] BID 


Sequential Compression Device [OM.PC] Per Unit Routine 


Weight bearing status [OM.PC] Routine 





01/21/20 11:48


Notify Provider Consults [RC] ASDIRECTED 


VTE/DVT Education [RC] Click to Edit 





01/21/20 13:00


Acetaminophen [Tylenol Arthritis Pain]   650 mg PO QID 


Nicotine [Habitrol]   21 mg TOP DAILY 





01/21/20 14:00


QUEtiapine [SEROqueL]   100 mg PO TID 





01/21/20 15:00


OT Evaluation and Treatment [CONS] Routine 


PT Evaluation and Treatment [CONS] Routine 





01/21/20 21:00


rOPINIRole [Requip]   1 mg PO BEDTIME 





01/22/20 07:30


Levothyroxine   25 mcg PO ACBREAKFAST 





01/22/20 08:00


Methotrexate   20 mg PO WE 





01/22/20 09:00


Aspirin [Ecotrin]   325 mg PO DAILY 


Cholecalciferol (Vitamin D3) [Vitamin D3]   100 mcg PO DAILY 


Chondroitin/Glucosamine [Glucosamine-Chondroitin 500-400 Capsule]   1 cap PO 

BID 


Metoprolol Succinate [Toprol XL]   25 mg PO DAILY 


Mometasone/Formoterol [Dulera 200-5 MCG]   2 puff IH DAILY 


Pantoprazole [ProTONIX***]   40 mg PO DAILY 


Sertraline [Zoloft]   200 mg PO DAILY 


Tiotropium [Spiriva HandiHaler]   0 mcg INH DAILY 


Vitamin B Complex with C [Total B With C]   1 each PO DAILY 


atoMOXetine [Strattera]   60 mg PO DAILY 


gemfibroziL [Lopid]   600 mg PO DAILY 





01/22/20 10:30


Lactated Ringers @  75 MLS/HR(1000ml) Lactated Ringers [Ringers, Lactated] 1,

000 ml IV ASDIRECTED 





01/22/20 11:45


Convert IV to Saline Lock [OM.PC] PER UNIT ROUTINE 


Ice Therapy [OM.PC] PER UNIT ROUTINE 


Oral Care [OM.PC] BID 





01/23/20 05:15


CBC WITH AUTO DIFF [HEME] DAILY 


COMPREHENSIVE METABOLIC PN,CMP [CHEM] DAILY 





01/23/20 11:45


Oral Care [OM.PC] BID 





01/24/20 05:15


CBC WITH AUTO DIFF [HEME] DAILY 


COMPREHENSIVE METABOLIC PN,CMP [CHEM] DAILY 





01/24/20 11:45


Oral Care [OM.PC] BID 





01/25/20 05:15


CBC WITH AUTO DIFF [HEME] DAILY 


COMPREHENSIVE METABOLIC PN,CMP [CHEM] DAILY 





01/25/20 11:45


Oral Care [OM.PC] BID 





01/26/20 05:15


CBC WITH AUTO DIFF [HEME] DAILY 





01/26/20 11:45


Oral Care [OM.PC] BID 





01/27/20 11:45


Oral Care [OM.PC] BID 





01/28/20 11:45


Oral Care [OM.PC] BID 





01/29/20 11:45


Oral Care [OM.PC] BID 





01/30/20 11:45


Oral Care [OM.PC] BID 














- Assessment


Assessment:: 


1. From a cardiac and pulmonary standpoint the patient is doing well.


2. Continue follow along until the patient is discharged.


3. I told the patient to discuss any pain and numbness with Dr. Ureña his 

orthopedic surgeon.








- Plan


Plan:: 





Assessment & Plan:


1. Continue to manage pain; added gabapentin and vistaril for treatment of pain 

and decreased dose of valium to 2mg instead of 5mg.


2. Added iron due to anemia, likely from blood loss during surgery; vitals 

stable.


3. Plan is to keep patient inpatient status to help him manage his pain and 

because he is not able to return to his own home at this time and care for 

himself while he is recovering from his total knee. He requires assistance with 

treatment of his pain both now as an inpatient and at skilled nursing facility, 

as he has a history of substance abuse. He will need skilled nurse care during 

recovery from his total knee, as he lives alone and does not have anyone to 

help him at home.

## 2020-01-22 NOTE — ANES
DATE OF PROCEDURE:  01/21/2020

 

REFERRING PHYSICIAN:  Jerzy Ureña DO

 

PATIENT STATUS:  A.m. admit.

 

ANESTHESIA PROVIDED:  For postop pain control.

 

PROCEDURE PERFORMED:  Left adductor canal block.

 

PREOPERATIVE DIAGNOSIS:  Left knee osteoarthritis with pain.

 

PROCEDURE PERFORMED TODAY:  Left uni-knee arthroplasty.

 

DESCRIPTION OF PROCEDURE:  The surgeon and the patient have both requested a

peripheral regional anesthesia for postoperative pain control.  I did receive

consent from the patient for the block, after discussing risks and benefits.  He

was taken to the postop recovery room after his surgery.  Monitors of a non-

invasive blood pressure, EKG, and pulse ox were applied.  Please refer to

nurse's notes for values.  The patient did have oxygen at 4 L per nasal cannula

during the procedure.  The patient had spinal anesthesia for his knee surgery,

and this was still in effect during the block with him starting to move feet

slightly.  His left leg was placed in a frog-leg position.  A time-out was

performed.  A preprocedure ultrasound scan was done, locating the left sartorius

muscle and femoral artery.  This area was prepped with ChloraPrep swab and was

allowed to dry.  No local was needed, due to the spinal still in effect.  Under

direct ultrasound visualization, I advanced a 20-gauge 4-inch Stimuplex Ultra

360 echogenic needle to the proper placement.  The needle was placed under the

sartorius muscle and into the adductor canal, superior to the left femoral

artery.  A total of 30 mL of 0.5% Naropin was injected in divided doses with

negative aspiration every 5 mL.  The needle was removed.  The patient had no

complaints throughout the procedure and tolerated the procedure well.

 

TIME OF PROCEDURE:  From 1134 to 1142.

 

Visited with the patient as leaving recovery room and has no complaints of any

knee pain.

 

DOCUMENTATION:  Please see the images in Radiology on the PACS system.

 

Job#: [id number]

DD: 01/21/2020 1317

DT: 01/21/2020 2031 HG/MODL

MTDD

## 2020-01-22 NOTE — PCM.PN
- General Info


Date of Service: 01/22/20


Admission Dx/Problem (Free Text): 


Patient states that he coughs a little bit this morning. He always does after 

he is a cigarette. He says his left knee is hurting. He has some numbness the 

left tibia area which is new. Nice fevers or chills.








- Patient Data


Vitals - Most Recent: 


 Last Vital Signs











Temp  99.3 F   01/22/20 05:00


 


Pulse  106 H  01/22/20 05:00


 


Resp  20   01/22/20 05:00


 


BP  111/76   01/22/20 05:00


 


Pulse Ox  97   01/22/20 05:00











Weight - Most Recent: 154 lb 1.65 oz


I&O - Last 24 Hours: 


 Intake & Output











 01/21/20 01/22/20 01/22/20





 22:59 06:59 14:59


 


Intake Total 2309 1272 


 


Output Total 450 200 


 


Balance 1859 1072 











Lab Results Last 24 Hours: 


 Laboratory Results - last 24 hr











  01/21/20 01/21/20 01/22/20 Range/Units





  07:50 07:50 06:16 


 


WBC     (4.5-12.0)  X10-3/uL


 


RBC     (4.30-5.75)  x10(6)uL


 


Hgb     (13.5-17.8)  g/dL


 


Hct     (30.0-51.3)  %


 


MCV     (80-96)  fL


 


MCH     (27.7-33.6)  pg


 


MCHC     (32.2-35.4)  g/dL


 


RDW     (11.5-15.5)  %


 


Plt Count     (125-369)  X10(3)uL


 


MPV     (7.4-10.4)  fL


 


Neut % (Auto)     (46-82)  %


 


Lymph % (Auto)     (13-37)  %


 


Mono % (Auto)     (4-12)  %


 


Eos % (Auto)     (1.0-5.0)  %


 


Baso % (Auto)     (0-2)  %


 


Neut # (Auto)     (1.6-8.3)  #


 


Lymph # (Auto)     (0.6-5.0)  #


 


Mono # (Auto)     (0.0-1.3)  #


 


Eos # (Auto)     (0.0-0.8)  #


 


Baso # (Auto)     (0.0-0.2)  #


 


PT  9.7   10.5  (8.7-11.1)  


 


INR  1.00   1.08  (0.89-1.13)  


 


APTT  24.5   24.6  (24.4-33.2)  SECONDS


 


Sodium     (135-145)  mmol/L


 


Potassium     (3.5-5.3)  mmol/L


 


Chloride     (100-110)  mmol/L


 


Carbon Dioxide     (21-32)  mmol/L


 


BUN     (7-18)  mg/dL


 


Creatinine     (0.70-1.30)  mg/dL


 


Est Cr Clr Drug Dosing     mL/min


 


Estimated GFR (MDRD)     (>60)  


 


BUN/Creatinine Ratio     (9-20)  


 


Glucose     ()  mg/dL


 


Calcium     (8.6-10.2)  mg/dL


 


Total Bilirubin     (0.1-1.3)  mg/dL


 


AST     (5-25)  IU/L


 


ALT     (12-36)  U/L


 


Alkaline Phosphatase     ()  IU/L


 


Total Protein     (6.0-8.0)  g/dL


 


Albumin     (3.5-5.2)  g/dL


 


Globulin     g/dL


 


Albumin/Globulin Ratio     


 


Blood Type   B POSITIVE   


 


Gel Antibody Screen   Negative   














  01/22/20 01/22/20 Range/Units





  06:16 06:16 


 


WBC  8.6   (4.5-12.0)  X10-3/uL


 


RBC  2.78 L   (4.30-5.75)  x10(6)uL


 


Hgb  7.7 L   (13.5-17.8)  g/dL


 


Hct  23.8 L   (30.0-51.3)  %


 


MCV  85.7   (80-96)  fL


 


MCH  27.8   (27.7-33.6)  pg


 


MCHC  32.5   (32.2-35.4)  g/dL


 


RDW  18.2 H   (11.5-15.5)  %


 


Plt Count  168   (125-369)  X10(3)uL


 


MPV  8.4   (7.4-10.4)  fL


 


Neut % (Auto)  73.7   (46-82)  %


 


Lymph % (Auto)  16.1   (13-37)  %


 


Mono % (Auto)  8.1   (4-12)  %


 


Eos % (Auto)  2   (1.0-5.0)  %


 


Baso % (Auto)  0   (0-2)  %


 


Neut # (Auto)  6.4   (1.6-8.3)  #


 


Lymph # (Auto)  1.4   (0.6-5.0)  #


 


Mono # (Auto)  0.7   (0.0-1.3)  #


 


Eos # (Auto)  0.1   (0.0-0.8)  #


 


Baso # (Auto)  0.0   (0.0-0.2)  #


 


PT    (8.7-11.1)  


 


INR    (0.89-1.13)  


 


APTT    (24.4-33.2)  SECONDS


 


Sodium   141  (135-145)  mmol/L


 


Potassium   4.0  (3.5-5.3)  mmol/L


 


Chloride   109  (100-110)  mmol/L


 


Carbon Dioxide   27  (21-32)  mmol/L


 


BUN   16  (7-18)  mg/dL


 


Creatinine   0.7  (0.70-1.30)  mg/dL


 


Est Cr Clr Drug Dosing   115.36  mL/min


 


Estimated GFR (MDRD)   > 60  (>60)  


 


BUN/Creatinine Ratio   22.9 H  (9-20)  


 


Glucose   111  ()  mg/dL


 


Calcium   8.3 L  (8.6-10.2)  mg/dL


 


Total Bilirubin   0.3  (0.1-1.3)  mg/dL


 


AST   19  (5-25)  IU/L


 


ALT   15  (12-36)  U/L


 


Alkaline Phosphatase   74  ()  IU/L


 


Total Protein   5.3 L  (6.0-8.0)  g/dL


 


Albumin   2.7 L  (3.5-5.2)  g/dL


 


Globulin   2.6  g/dL


 


Albumin/Globulin Ratio   1.0  


 


Blood Type    


 


Gel Antibody Screen    











Med Orders - Current: 


 Current Medications





Acetaminophen (Tylenol Arthritis Pain)  650 mg PO QID [facility]


   Last Admin: 01/21/20 20:37 Dose:  650 mg


Aspirin (Ecotrin)  325 mg PO DAILY [facility]


Bisacodyl (Dulcolax)  10 mg PO DAILY PRN


   PRN Reason: Constipation


Cholecalciferol (Vitamin D3)  100 mcg PO DAILY [facility]


Diazepam (Valium)  5 mg IVPUSH Q6H PRN


   PRN Reason: Spasms


Diphenhydramine HCl (Benadryl)  25 mg IVPUSH Q4H PRN


   PRN Reason: Itching


Docusate Sodium (Colace)  100 mg PO BID PRN


   PRN Reason: Constipation


Gemfibrozil (Lopid)  600 mg PO DAILY [facility]


Glucosamine/Chondroitin (Glucosamine-Chondroitin 500-400 Capsule)  1 cap PO BID 

[facility]


Lactated Ringer's (Ringers, Lactated)  1,000 mls @ 125 mls/hr IV ASDIRECTED [facility]


   Last Admin: 01/21/20 19:02 Dose:  125 mls/hr


Lactated Ringer's (Ringers, Lactated)  1,000 mls @ 125 mls/hr IV ASDIRECTED [facility]


   Last Admin: 01/22/20 02:34 Dose:  125 mls/hr


Lactated Ringer's (Ringers, Lactated)  500 mls @ 250 mls/hr IV BOLUS [facility]


Levothyroxine Sodium (Levothyroxine)  25 mcg PO ACBREAKFAST [facility]


Magnesium Hydroxide (Milk Of Magnesia)  30 ml PO BID PRN


   PRN Reason: Constipation


Methotrexate (Methotrexate)  20 mg PO WE [facility]


Metoprolol Succinate (Toprol Xl)  25 mg PO DAILY [facility]


Mometasone Furoate/Formoterol Fumar (Dulera 200-5 Mcg)  2 puff IH DAILY [facility]


Morphine Sulfate (Morphine)  2 mg IVPUSH Q2H PRN


   PRN Reason: Breakthrough Pain


   Last Admin: 01/22/20 03:08 Dose:  2 mg


Multivitamins (Total B With C)  1 each PO DAILY [facility]


Naloxone HCl (Narcan)  0.1 mg IVPUSH ONETIME PRN


   PRN Reason: Oversedation


Nicotine (Habitrol)  21 mg TOP DAILY [facility]


   Last Admin: 01/21/20 13:37 Dose:  21 mg


Non-Formulary Medication (Atomoxetine [Strattera])  60 mg PO DAILY [facility]


Ondansetron HCl (Zofran)  4 mg IVPUSH Q4H PRN


   PRN Reason: Nausea/Vomiting


Oxycodone/Acetaminophen (Percocet 325-5 Mg)  2 tab PO Q4H PRN


   PRN Reason: Pain (moderate 4-6)


   Last Admin: 01/22/20 04:55 Dose:  2 tab


Pantoprazole Sodium (Protonix***)  40 mg PO DAILY [facility]


Quetiapine Fumarate (Seroquel)  100 mg PO TID [facility]


   Last Admin: 01/21/20 20:36 Dose:  100 mg


Ropinirole HCl (Requip)  1 mg PO BEDTIME [facility]


   Last Admin: 01/21/20 20:35 Dose:  1 mg


Senna (Senna)  8.6 mg PO BID PRN


   PRN Reason: Constipation


Sertraline HCl (Zoloft)  200 mg PO DAILY [facility]


Sodium Chloride (Saline Flush)  10 ml FLUSH ASDIRECTED PRN


   PRN Reason: Keep Vein Open


Sodium Chloride (Saline Flush)  10 ml FLUSH ASDIRECTED PRN


   PRN Reason: Keep Vein Open


Tiotropium Bromide (Spiriva Handihaler)  0 mcg INH DAILY [facility]


Tramadol HCl (Ultram)  100 mg PO Q6H PRN


   PRN Reason: Pain (mild 1-3)


   Last Admin: 01/22/20 02:06 Dose:  100 mg


Zolpidem Tartrate (Ambien)  5 mg PO BEDTIME PRN


   PRN Reason: Sleep


   Last Admin: 01/21/20 20:45 Dose:  5 mg





Discontinued Medications





Acetaminophen (Tylenol Extra Strength)  1,000 mg PO ONETIME ONE


   Stop: 01/21/20 07:16


   Last Admin: 01/21/20 08:36 Dose:  1,000 mg


Cefazolin Sodium (Ancef)  1 gm IVPUSH ONETIME ONE


   Stop: 01/21/20 09:01


   Last Admin: 01/21/20 08:49 Dose:  1 gm


Ropivacaine 49.25 ml/Ketorolac Tromethamine 30 mg/Epinephrine HCl 0.5 mg/

Clonidine HCl 80 mcg/ Sodium Chloride 48.45 ml  0 ml INJECT ASDIRECTED ONE


   Stop: 01/21/20 09:01


   Last Admin: 01/21/20 11:42 Dose:  100 syringe


Tranexamic Acid 3,000 mg/ (Sodium Chloride 100 ml)  0 mg IRR ONETIME ONE


   Stop: 01/21/20 09:01


   Last Admin: 01/21/20 11:44 Dose:  100 irr


Gabapentin (Neurontin)  300 mg PO ONETIME ONE


   Stop: 01/21/20 07:16


   Last Admin: 01/21/20 08:36 Dose:  300 mg


Scopolamine (Transderm-Scop)  1.5 mg TRDERM ONETIME ONE


   Stop: 01/21/20 07:16


   Last Admin: 01/21/20 08:36 Dose:  1.5 mg


Vancomycin HCl (Vancomycin)  1 gm .XX .STK-MED ONE


   Stop: 01/21/20 09:59


   Last Admin: 01/21/20 09:58 Dose:  1 gm











- Exam


General: Alert, Oriented, Cooperative


Lungs: Clear to Auscultation, Normal Respiratory Effort


Cardiovascular: Regular Rate, Regular Rhythm, No Murmurs





Sepsis Event Note





- Evaluation


Sepsis Screening Result: No Definite Risk





- Focused Exam


Vital Signs: 


 Vital Signs











  Temp Pulse Resp BP Pulse Ox


 


 01/22/20 05:00  99.3 F  106 H  20  111/76  97


 


 01/22/20 01:00  99.1 F  78  20  93/61  98


 


 01/21/20 22:30  98.6 F  80  18  106/63  97











Date Exam was Performed: 01/22/20


Time Exam was Performed: 08:10





- Problem List & Annotations


(1) S/P total knee arthroplasty


SNOMED Code(s): 8007513735163, 555925634, 8335003068803


   Code(s): Z96.659 - PRESENCE OF UNSPECIFIED ARTIFICIAL KNEE JOINT   Status: 

Acute   Current Visit: Yes   





(2) Atrial fibrillation


SNOMED Code(s): 84837314


   Code(s): I48.91 - UNSPECIFIED ATRIAL FIBRILLATION   Status: Acute   Current 

Visit: Yes   





(3) Hypertension


SNOMED Code(s): 64853646


   Code(s): I10 - ESSENTIAL (PRIMARY) HYPERTENSION   Status: Acute   Current 

Visit: Yes   





(4) History of alcohol dependence


SNOMED Code(s): 751340866


   Code(s): F10.21 - ALCOHOL DEPENDENCE, IN REMISSION   Status: Acute   Current 

Visit: Yes   





(5) Depression


SNOMED Code(s): 17177554


   Code(s): F32.9 - MAJOR DEPRESSIVE DISORDER, SINGLE EPISODE, UNSPECIFIED   

Status: Acute   Current Visit: Yes   





(6) Asthma


SNOMED Code(s): 110383876


   Code(s): J45.909 - UNSPECIFIED ASTHMA, UNCOMPLICATED   Status: Acute   

Current Visit: Yes   





- Problem List Review


Problem List Initiated/Reviewed/Updated: Yes





- My Orders


Last 24 Hours: 


My Active Orders





01/21/20 13:30


Lactated Ringers [Ringers, Lactated] 500 ml IV BOLUS 














- Assessment


Assessment:: 


1. From a cardiac and pulmonary standpoint the patient is doing well.


2. Continue follow along until the patient is discharged.


3. I told the patient to discuss any pain and numbness with Dr. Ureña his 

orthopedic surgeon.

## 2020-01-23 RX ADMIN — ACETAMINOPHEN SCH MG: 650 TABLET, FILM COATED, EXTENDED RELEASE ORAL at 09:07

## 2020-01-23 RX ADMIN — OXYCODONE HYDROCHLORIDE AND ACETAMINOPHEN PRN TAB: 5; 325 TABLET ORAL at 13:32

## 2020-01-23 RX ADMIN — OXYCODONE HYDROCHLORIDE AND ACETAMINOPHEN PRN TAB: 5; 325 TABLET ORAL at 01:39

## 2020-01-23 RX ADMIN — ASPIRIN SCH MG: 325 TABLET, DELAYED RELEASE ORAL at 09:08

## 2020-01-23 RX ADMIN — Medication PRN ML: at 22:00

## 2020-01-23 RX ADMIN — VITAMIN C SCH EACH: TAB at 09:09

## 2020-01-23 RX ADMIN — Medication SCH CAP: at 20:36

## 2020-01-23 RX ADMIN — OXYCODONE HYDROCHLORIDE AND ACETAMINOPHEN PRN TAB: 5; 325 TABLET ORAL at 20:32

## 2020-01-23 RX ADMIN — ONDANSETRON PRN MG: 2 INJECTION, SOLUTION INTRAMUSCULAR; INTRAVENOUS at 07:39

## 2020-01-23 RX ADMIN — Medication SCH CAP: at 09:09

## 2020-01-23 RX ADMIN — MOMETASONE FUROATE AND FORMOTEROL FUMARATE DIHYDRATE SCH PUFF: 200; 5 AEROSOL RESPIRATORY (INHALATION) at 09:10

## 2020-01-23 RX ADMIN — OXYCODONE HYDROCHLORIDE AND ACETAMINOPHEN PRN TAB: 5; 325 TABLET ORAL at 05:59

## 2020-01-23 RX ADMIN — VITAMIN D, TAB 1000IU (100/BT) SCH MCG: 25 TAB at 09:09

## 2020-01-23 RX ADMIN — TIOTROPIUM BROMIDE SCH CAP: 18 CAPSULE ORAL; RESPIRATORY (INHALATION) at 09:15

## 2020-01-23 NOTE — PCM.PN
- General Info


Date of Service: 01/23/20


Admission Dx/Problem (Free Text): 


Patient states he feels little lightheaded this morning and nauseated. The 

nurse gave him some Zofran. He wants to lay down in the bed. He has a little 

cough that's persisting and now he has green phlegm. He has a history of COPD. 

He says when this happens he normally goes to the clinic as a shot of Kenalog 

and is better. Feels little short of breath and wheezing. no chest pain





Functional Status: Reports: Pain Controlled, Tolerating Diet, Ambulating, 

Urinating





- Review of Systems


General: Reports: No Symptoms


HEENT: Reports: No Symptoms


Pulmonary: Reports: No Symptoms


Cardiovascular: Reports: No Symptoms


Gastrointestinal: Reports: No Symptoms


Genitourinary: Reports: No Symptoms


Musculoskeletal: Reports: No Symptoms, Leg Pain, Joint Pain, Joint Swelling


Skin: Reports: No Symptoms


Neurological: Reports: No Symptoms


Psychiatric: Reports: No Symptoms





- Patient Data


Vitals - Most Recent: 


 Last Vital Signs











Temp  98.4 F   01/23/20 07:43


 


Pulse  103 H  01/23/20 09:09


 


Resp  20   01/23/20 07:43


 


BP  132/82   01/23/20 09:09


 


Pulse Ox  98   01/23/20 07:43











Weight - Most Recent: 154 lb 1.65 oz


I&O - Last 24 Hours: 


 Intake & Output











 01/22/20 01/23/20 01/23/20





 22:59 06:59 14:59


 


Intake Total 558  705


 


Output Total 1000 700 


 


Balance -442 -700 705











Lab Results Last 24 Hours: 


 Laboratory Results - last 24 hr











  01/23/20 01/23/20 Range/Units





  06:45 06:45 


 


WBC  9.3   (4.5-12.0)  X10-3/uL


 


RBC  2.93 L   (4.30-5.75)  x10(6)uL


 


Hgb  8.4 L   (13.5-17.8)  g/dL


 


Hct  25.0 L   (30.0-51.3)  %


 


MCV  85.4   (80-96)  fL


 


MCH  28.7   (27.7-33.6)  pg


 


MCHC  33.6   (32.2-35.4)  g/dL


 


RDW  17.5 H   (11.5-15.5)  %


 


Plt Count  195   (125-369)  X10(3)uL


 


MPV  7.7   (7.4-10.4)  fL


 


Neut % (Auto)  81.8   (46-82)  %


 


Lymph % (Auto)  11.7 L   (13-37)  %


 


Mono % (Auto)  4.7   (4-12)  %


 


Eos % (Auto)  2   (1.0-5.0)  %


 


Baso % (Auto)  0   (0-2)  %


 


Neut # (Auto)  7.7   (1.6-8.3)  #


 


Lymph # (Auto)  1.1   (0.6-5.0)  #


 


Mono # (Auto)  0.4   (0.0-1.3)  #


 


Eos # (Auto)  0.1   (0.0-0.8)  #


 


Baso # (Auto)  0.0   (0.0-0.2)  #


 


Sodium   141  (135-145)  mmol/L


 


Potassium   3.8  (3.5-5.3)  mmol/L


 


Chloride   107  (100-110)  mmol/L


 


Carbon Dioxide   25  (21-32)  mmol/L


 


BUN   8  (7-18)  mg/dL


 


Creatinine   0.7  (0.70-1.30)  mg/dL


 


Est Cr Clr Drug Dosing   115.36  mL/min


 


Estimated GFR (MDRD)   > 60  (>60)  


 


BUN/Creatinine Ratio   11.4  (9-20)  


 


Glucose   107  ()  mg/dL


 


Calcium   8.5 L  (8.6-10.2)  mg/dL


 


Total Bilirubin   0.6  (0.1-1.3)  mg/dL


 


AST   23  D  (5-25)  IU/L


 


ALT   16  (12-36)  U/L


 


Alkaline Phosphatase   77  ()  IU/L


 


Total Protein   6.5  (6.0-8.0)  g/dL


 


Albumin   3.0 L  (3.5-5.2)  g/dL


 


Globulin   3.5  g/dL


 


Albumin/Globulin Ratio   0.9  











Med Orders - Current: 


 Current Medications





Acetaminophen (Tylenol Arthritis Pain)  650 mg PO QID [facility]


   Last Admin: 01/23/20 09:07 Dose:  650 mg


Aspirin (Ecotrin)  325 mg PO DAILY [facility]


   Last Admin: 01/23/20 09:08 Dose:  325 mg


Atomoxetine HCl (Strattera)  60 mg PO DAILY [facility]


   Last Admin: 01/23/20 09:08 Dose:  60 mg


Bisacodyl (Dulcolax)  10 mg PO DAILY PRN


   PRN Reason: Constipation


Cephalexin (Keflex)  500 mg PO TID [facility]


Cholecalciferol (Vitamin D3)  100 mcg PO DAILY [facility]


   Last Admin: 01/23/20 09:09 Dose:  100 mcg


Diazepam (Valium)  2 mg IVPUSH Q6H PRN


   PRN Reason: Agitation


Diphenhydramine HCl (Benadryl)  25 mg IVPUSH Q4H PRN


   PRN Reason: Itching


Docusate Sodium (Colace)  100 mg PO BID PRN


   PRN Reason: Constipation


   Last Admin: 01/23/20 07:42 Dose:  100 mg


Ferrous Sulfate (Ferrous Sulfate)  325 mg PO WITHBREAKFAST [facility]


   Last Admin: 01/23/20 07:42 Dose:  325 mg


Gabapentin (Neurontin)  300 mg PO TID [facility]


   Last Admin: 01/23/20 09:15 Dose:  300 mg


Gemfibrozil (Lopid)  600 mg PO DAILY [facility]


   Last Admin: 01/23/20 09:08 Dose:  600 mg


Glucosamine/Chondroitin (Glucosamine-Chondroitin 500-400 Capsule)  1 cap PO BID 

[facility]


   Last Admin: 01/23/20 09:09 Dose:  1 cap


Hydroxyzine HCl (Atarax)  25 mg PO Q8H [facility]


   Last Admin: 01/23/20 02:33 Dose:  25 mg


Lactated Ringer's (Ringers, Lactated)  1,000 mls @ 75 mls/hr IV ASDIRECTED [facility]


   Last Admin: 01/23/20 02:22 Dose:  75 mls/hr


Levothyroxine Sodium (Levothyroxine)  25 mcg PO ACBREAKFAST [facility]


   Last Admin: 01/23/20 07:42 Dose:  25 mcg


Magnesium Hydroxide (Milk Of Magnesia)  30 ml PO BID PRN


   PRN Reason: Constipation


Methotrexate (Methotrexate)  20 mg PO WE [facility]


   Last Admin: 01/22/20 08:47 Dose:  20 mg


Metoprolol Succinate (Toprol Xl)  25 mg PO DAILY [facility]


   Last Admin: 01/23/20 09:09 Dose:  25 mg


Mometasone Furoate/Formoterol Fumar (Dulera 200-5 Mcg)  2 puff IH DAILY [facility]


   Last Admin: 01/23/20 09:10 Dose:  2 puff


Morphine Sulfate (Morphine)  2 mg IVPUSH Q2H PRN


   PRN Reason: Breakthrough Pain


   Last Admin: 01/23/20 07:39 Dose:  2 mg


Multivitamins (Total B With C)  1 each PO DAILY [facility]


   Last Admin: 01/23/20 09:09 Dose:  1 each


Naloxone HCl (Narcan)  0.1 mg IVPUSH ONETIME PRN


   PRN Reason: Oversedation


Nicotine (Habitrol)  21 mg TOP DAILY [facility]


   Last Admin: 01/23/20 09:11 Dose:  21 mg


Ondansetron HCl (Zofran)  4 mg IVPUSH Q4H PRN


   PRN Reason: Nausea/Vomiting


   Last Admin: 01/23/20 07:39 Dose:  4 mg


Oxycodone/Acetaminophen (Percocet 325-5 Mg)  2 tab PO Q4H PRN


   PRN Reason: Pain (moderate 4-6)


   Last Admin: 01/23/20 05:59 Dose:  2 tab


Pantoprazole Sodium (Protonix***)  40 mg PO DAILY [facility]


   Last Admin: 01/23/20 09:09 Dose:  40 mg


Quetiapine Fumarate (Seroquel)  100 mg PO TID [facility]


   Last Admin: 01/23/20 09:10 Dose:  100 mg


Ropinirole HCl (Requip)  1 mg PO BEDTIME [facility]


   Last Admin: 01/22/20 21:40 Dose:  1 mg


Senna (Senna)  8.6 mg PO BID PRN


   PRN Reason: Constipation


Sertraline HCl (Zoloft)  200 mg PO DAILY [facility]


   Last Admin: 01/23/20 09:08 Dose:  200 mg


Sodium Chloride (Saline Flush)  10 ml FLUSH ASDIRECTED PRN


   PRN Reason: Keep Vein Open


   Last Admin: 01/22/20 11:25 Dose:  10 ml


Sodium Chloride (Saline Flush)  10 ml FLUSH ASDIRECTED PRN


   PRN Reason: Keep Vein Open


Tiotropium Bromide (Spiriva Handihaler)  0 mcg INH DAILY [facility]


   Last Admin: 01/23/20 09:15 Dose:  1 cap


Tramadol HCl (Ultram)  100 mg PO Q6H PRN


   PRN Reason: Pain (mild 1-3)


   Last Admin: 01/22/20 02:06 Dose:  100 mg


Zolpidem Tartrate (Ambien)  5 mg PO BEDTIME PRN


   PRN Reason: Sleep


   Last Admin: 01/22/20 21:42 Dose:  5 mg





Discontinued Medications





Acetaminophen (Tylenol Extra Strength)  1,000 mg PO ONETIME ONE


   Stop: 01/21/20 07:16


   Last Admin: 01/21/20 08:36 Dose:  1,000 mg


Azithromycin (Zithromax)  500 mg PO ONETIME ONE


   Stop: 01/23/20 09:21


Cefazolin Sodium (Ancef)  1 gm IVPUSH ONETIME ONE


   Stop: 01/21/20 09:01


   Last Admin: 01/21/20 08:49 Dose:  1 gm


Ropivacaine 49.25 ml/Ketorolac Tromethamine 30 mg/Epinephrine HCl 0.5 mg/

Clonidine HCl 80 mcg/ Sodium Chloride 48.45 ml  0 ml INJECT ASDIRECTED ONE


   Stop: 01/21/20 09:01


   Last Admin: 01/21/20 11:42 Dose:  100 syringe


Tranexamic Acid 3,000 mg/ (Sodium Chloride 100 ml)  0 mg IRR ONETIME ONE


   Stop: 01/21/20 09:01


   Last Admin: 01/21/20 11:44 Dose:  100 irr


Diazepam (Valium)  5 mg IVPUSH Q6H PRN


   PRN Reason: Spasms


Gabapentin (Neurontin)  300 mg PO ONETIME ONE


   Stop: 01/21/20 07:16


   Last Admin: 01/21/20 08:36 Dose:  300 mg


Hydroxyzine Pamoate (Vistaril)  25 mg PO Q8H [facility]


   Last Admin: 01/22/20 12:44 Dose:  Not Given


Lactated Ringer's (Ringers, Lactated)  1,000 mls @ 125 mls/hr IV ASDIRECTED [facility]


   Last Admin: 01/21/20 19:02 Dose:  125 mls/hr


Lactated Ringer's (Ringers, Lactated)  1,000 mls @ 125 mls/hr IV ASDIRECTED [facility]


   Last Infusion: 01/22/20 11:13 Dose:  Infused


Lactated Ringer's (Ringers, Lactated)  500 mls @ 250 mls/hr IV BOLUS [facility]


Scopolamine (Transderm-Scop)  1.5 mg TRDERM ONETIME ONE


   Stop: 01/21/20 07:16


   Last Admin: 01/21/20 08:36 Dose:  1.5 mg


Triamcinolone Acetonide (Kenalog-40)  60 mg IM ONETIME ONE


   Stop: 01/23/20 09:23


Vancomycin HCl (Vancomycin)  1 gm .XX .STK-MED ONE


   Stop: 01/21/20 09:59


   Last Admin: 01/21/20 09:58 Dose:  1 gm











- Exam


General: Alert, Oriented, Cooperative, Mild Distress


HEENT: Pupils Equal, Pupils Reactive, EOMI, Mucous Membr. Moist/Pink


Neck: Supple, Trachea Midline


Lungs: Normal Respiratory Effort


GI/Abdominal Exam: No Distention


Extremities: Joint Swelling, Leg Pain, Limited Range of Motion


Peripheral Pulses: 2+: Dorsalis Pedis (L), Dorsalis Pedis (R)


Skin: Warm, Dry, Intact


Wound/Incisions: Healing Well, Dressing Dry and Intact, No Drainage


Neurological: No New Focal Deficit


Psy/Mental Status: Alert, Normal Affect, Normal Mood





Sepsis Event Note





- Evaluation


Sepsis Screening Result: No Definite Risk





- Focused Exam


Vital Signs: 


 Vital Signs











  Temp Pulse Pulse Resp BP BP Pulse Ox


 


 01/23/20 09:09   103 H    132/82  


 


 01/23/20 07:43  98.4 F   103 H  20   132/82  98


 


 01/22/20 23:30  98.5 F   100  18   111/70  98











Date Exam was Performed: 01/23/20


Time Exam was Performed: 09:52





- Problem List & Annotations


(1) S/P total knee arthroplasty


SNOMED Code(s): 5070031728525, 559538019, 5294010724955


   Code(s): Z96.659 - PRESENCE OF UNSPECIFIED ARTIFICIAL KNEE JOINT   Status: 

Acute   Priority: High   Current Visit: Yes   Onset Date: ~01/21/20   


Qualifiers: 


   Laterality: left   Qualified Code(s): Z96.652 - Presence of left artificial 

knee joint   





- Problem List Review


Problem List Initiated/Reviewed/Updated: Yes





- Plan


Plan:: 


assessment: 54-year-old male  postoperative day #2 status post left total knee 

arthroplasty





Plan: pt/ot/pain control/dvt prophylaxis, kenalog and abx per medicine for copd


   plan to dc to swing bed in [place] tomorrow

## 2020-01-23 NOTE — PCM.PN
- General Info


Date of Service: 01/23/20


Admission Dx/Problem (Free Text): 


Patient states he feels little lightheaded this morning and nauseated. The 

nurse gave him some Zofran. He wants to lay down in the bed. He has a little 

cough that's persisting and now he has green phlegm. He has a history of COPD. 

He says when this happens he normally goes to the clinic as a shot of Kenalog 

and is better. Feels little short of breath and wheezing. no chest pain








- Patient Data


Vitals - Most Recent: 


 Last Vital Signs











Temp  98.4 F   01/23/20 07:43


 


Pulse  103 H  01/23/20 09:09


 


Resp  20   01/23/20 07:43


 


BP  132/82   01/23/20 09:09


 


Pulse Ox  98   01/23/20 07:43











Weight - Most Recent: 154 lb 1.65 oz


I&O - Last 24 Hours: 


 Intake & Output











 01/22/20 01/23/20 01/23/20





 22:59 06:59 14:59


 


Intake Total 558  705


 


Output Total 1000 700 


 


Balance -442 -700 705











Lab Results Last 24 Hours: 


 Laboratory Results - last 24 hr











  01/23/20 01/23/20 Range/Units





  06:45 06:45 


 


WBC  9.3   (4.5-12.0)  X10-3/uL


 


RBC  2.93 L   (4.30-5.75)  x10(6)uL


 


Hgb  8.4 L   (13.5-17.8)  g/dL


 


Hct  25.0 L   (30.0-51.3)  %


 


MCV  85.4   (80-96)  fL


 


MCH  28.7   (27.7-33.6)  pg


 


MCHC  33.6   (32.2-35.4)  g/dL


 


RDW  17.5 H   (11.5-15.5)  %


 


Plt Count  195   (125-369)  X10(3)uL


 


MPV  7.7   (7.4-10.4)  fL


 


Neut % (Auto)  81.8   (46-82)  %


 


Lymph % (Auto)  11.7 L   (13-37)  %


 


Mono % (Auto)  4.7   (4-12)  %


 


Eos % (Auto)  2   (1.0-5.0)  %


 


Baso % (Auto)  0   (0-2)  %


 


Neut # (Auto)  7.7   (1.6-8.3)  #


 


Lymph # (Auto)  1.1   (0.6-5.0)  #


 


Mono # (Auto)  0.4   (0.0-1.3)  #


 


Eos # (Auto)  0.1   (0.0-0.8)  #


 


Baso # (Auto)  0.0   (0.0-0.2)  #


 


Sodium   141  (135-145)  mmol/L


 


Potassium   3.8  (3.5-5.3)  mmol/L


 


Chloride   107  (100-110)  mmol/L


 


Carbon Dioxide   25  (21-32)  mmol/L


 


BUN   8  (7-18)  mg/dL


 


Creatinine   0.7  (0.70-1.30)  mg/dL


 


Est Cr Clr Drug Dosing   115.36  mL/min


 


Estimated GFR (MDRD)   > 60  (>60)  


 


BUN/Creatinine Ratio   11.4  (9-20)  


 


Glucose   107  ()  mg/dL


 


Calcium   8.5 L  (8.6-10.2)  mg/dL


 


Total Bilirubin   0.6  (0.1-1.3)  mg/dL


 


AST   23  D  (5-25)  IU/L


 


ALT   16  (12-36)  U/L


 


Alkaline Phosphatase   77  ()  IU/L


 


Total Protein   6.5  (6.0-8.0)  g/dL


 


Albumin   3.0 L  (3.5-5.2)  g/dL


 


Globulin   3.5  g/dL


 


Albumin/Globulin Ratio   0.9  











Med Orders - Current: 


 Current Medications





Acetaminophen (Tylenol Arthritis Pain)  650 mg PO QID [facility]


   Last Admin: 01/23/20 09:07 Dose:  650 mg


Aspirin (Ecotrin)  325 mg PO DAILY [facility]


   Last Admin: 01/23/20 09:08 Dose:  325 mg


Atomoxetine HCl (Strattera)  60 mg PO DAILY [facility]


   Last Admin: 01/23/20 09:08 Dose:  60 mg


Azithromycin (Zithromax)  500 mg PO ONETIME ONE


   Stop: 01/23/20 09:21


Azithromycin (Zithromax)  250 mg PO DAILY [facility]


   Stop: 01/27/20 09:01


Bisacodyl (Dulcolax)  10 mg PO DAILY PRN


   PRN Reason: Constipation


Cholecalciferol (Vitamin D3)  100 mcg PO DAILY [facility]


   Last Admin: 01/23/20 09:09 Dose:  100 mcg


Diazepam (Valium)  2 mg IVPUSH Q6H PRN


   PRN Reason: Agitation


Diphenhydramine HCl (Benadryl)  25 mg IVPUSH Q4H PRN


   PRN Reason: Itching


Docusate Sodium (Colace)  100 mg PO BID PRN


   PRN Reason: Constipation


   Last Admin: 01/23/20 07:42 Dose:  100 mg


Ferrous Sulfate (Ferrous Sulfate)  325 mg PO WITHBREAKFAST [facility]


   Last Admin: 01/23/20 07:42 Dose:  325 mg


Gabapentin (Neurontin)  300 mg PO TID [facility]


   Last Admin: 01/23/20 09:15 Dose:  300 mg


Gemfibrozil (Lopid)  600 mg PO DAILY [facility]


   Last Admin: 01/23/20 09:08 Dose:  600 mg


Glucosamine/Chondroitin (Glucosamine-Chondroitin 500-400 Capsule)  1 cap PO BID 

[facility]


   Last Admin: 01/23/20 09:09 Dose:  1 cap


Hydroxyzine HCl (Atarax)  25 mg PO Q8H [facility]


   Last Admin: 01/23/20 02:33 Dose:  25 mg


Lactated Ringer's (Ringers, Lactated)  1,000 mls @ 75 mls/hr IV ASDIRECTED [facility]


   Last Admin: 01/23/20 02:22 Dose:  75 mls/hr


Levothyroxine Sodium (Levothyroxine)  25 mcg PO ACBREAKFAST [facility]


   Last Admin: 01/23/20 07:42 Dose:  25 mcg


Magnesium Hydroxide (Milk Of Magnesia)  30 ml PO BID PRN


   PRN Reason: Constipation


Methotrexate (Methotrexate)  20 mg PO WE [facility]


   Last Admin: 01/22/20 08:47 Dose:  20 mg


Metoprolol Succinate (Toprol Xl)  25 mg PO DAILY [facility]


   Last Admin: 01/23/20 09:09 Dose:  25 mg


Mometasone Furoate/Formoterol Fumar (Dulera 200-5 Mcg)  2 puff IH DAILY [facility]


   Last Admin: 01/23/20 09:10 Dose:  2 puff


Morphine Sulfate (Morphine)  2 mg IVPUSH Q2H PRN


   PRN Reason: Breakthrough Pain


   Last Admin: 01/23/20 07:39 Dose:  2 mg


Multivitamins (Total B With C)  1 each PO DAILY [facility]


   Last Admin: 01/23/20 09:09 Dose:  1 each


Naloxone HCl (Narcan)  0.1 mg IVPUSH ONETIME PRN


   PRN Reason: Oversedation


Nicotine (Habitrol)  21 mg TOP DAILY [facility]


   Last Admin: 01/23/20 09:11 Dose:  21 mg


Ondansetron HCl (Zofran)  4 mg IVPUSH Q4H PRN


   PRN Reason: Nausea/Vomiting


   Last Admin: 01/23/20 07:39 Dose:  4 mg


Oxycodone/Acetaminophen (Percocet 325-5 Mg)  2 tab PO Q4H PRN


   PRN Reason: Pain (moderate 4-6)


   Last Admin: 01/23/20 05:59 Dose:  2 tab


Pantoprazole Sodium (Protonix***)  40 mg PO DAILY [facility]


   Last Admin: 01/23/20 09:09 Dose:  40 mg


Quetiapine Fumarate (Seroquel)  100 mg PO TID [facility]


   Last Admin: 01/23/20 09:10 Dose:  100 mg


Ropinirole HCl (Requip)  1 mg PO BEDTIME [facility]


   Last Admin: 01/22/20 21:40 Dose:  1 mg


Senna (Senna)  8.6 mg PO BID PRN


   PRN Reason: Constipation


Sertraline HCl (Zoloft)  200 mg PO DAILY [facility]


   Last Admin: 01/23/20 09:08 Dose:  200 mg


Sodium Chloride (Saline Flush)  10 ml FLUSH ASDIRECTED PRN


   PRN Reason: Keep Vein Open


   Last Admin: 01/22/20 11:25 Dose:  10 ml


Sodium Chloride (Saline Flush)  10 ml FLUSH ASDIRECTED PRN


   PRN Reason: Keep Vein Open


Tiotropium Bromide (Spiriva Handihaler)  0 mcg INH DAILY [facility]


   Last Admin: 01/23/20 09:15 Dose:  1 cap


Tramadol HCl (Ultram)  100 mg PO Q6H PRN


   PRN Reason: Pain (mild 1-3)


   Last Admin: 01/22/20 02:06 Dose:  100 mg


Triamcinolone Acetonide (Kenalog-40)  60 mg IM ONETIME ONE


   Stop: 01/23/20 09:23


Zolpidem Tartrate (Ambien)  5 mg PO BEDTIME PRN


   PRN Reason: Sleep


   Last Admin: 01/22/20 21:42 Dose:  5 mg





Discontinued Medications





Acetaminophen (Tylenol Extra Strength)  1,000 mg PO ONETIME ONE


   Stop: 01/21/20 07:16


   Last Admin: 01/21/20 08:36 Dose:  1,000 mg


Cefazolin Sodium (Ancef)  1 gm IVPUSH ONETIME ONE


   Stop: 01/21/20 09:01


   Last Admin: 01/21/20 08:49 Dose:  1 gm


Ropivacaine 49.25 ml/Ketorolac Tromethamine 30 mg/Epinephrine HCl 0.5 mg/

Clonidine HCl 80 mcg/ Sodium Chloride 48.45 ml  0 ml INJECT ASDIRECTED ONE


   Stop: 01/21/20 09:01


   Last Admin: 01/21/20 11:42 Dose:  100 syringe


Tranexamic Acid 3,000 mg/ (Sodium Chloride 100 ml)  0 mg IRR ONETIME ONE


   Stop: 01/21/20 09:01


   Last Admin: 01/21/20 11:44 Dose:  100 irr


Diazepam (Valium)  5 mg IVPUSH Q6H PRN


   PRN Reason: Spasms


Gabapentin (Neurontin)  300 mg PO ONETIME ONE


   Stop: 01/21/20 07:16


   Last Admin: 01/21/20 08:36 Dose:  300 mg


Hydroxyzine Pamoate (Vistaril)  25 mg PO Q8H [facility]


   Last Admin: 01/22/20 12:44 Dose:  Not Given


Lactated Ringer's (Ringers, Lactated)  1,000 mls @ 125 mls/hr IV ASDIRECTED [facility]


   Last Admin: 01/21/20 19:02 Dose:  125 mls/hr


Lactated Ringer's (Ringers, Lactated)  1,000 mls @ 125 mls/hr IV ASDIRECTED [facility]


   Last Infusion: 01/22/20 11:13 Dose:  Infused


Lactated Ringer's (Ringers, Lactated)  500 mls @ 250 mls/hr IV BOLUS [facility]


Scopolamine (Transderm-Scop)  1.5 mg TRDERM ONETIME ONE


   Stop: 01/21/20 07:16


   Last Admin: 01/21/20 08:36 Dose:  1.5 mg


Vancomycin HCl (Vancomycin)  1 gm .XX .STK-MED ONE


   Stop: 01/21/20 09:59


   Last Admin: 01/21/20 09:58 Dose:  1 gm











- Exam


General: Alert, Oriented, Cooperative


Lungs: Clear to Auscultation, Normal Respiratory Effort.  No: Crackles, Rales, 

Rhonchi


Cardiovascular: Regular Rate, Regular Rhythm, No Murmurs





Sepsis Event Note





- Evaluation


Sepsis Screening Result: No Definite Risk





- Focused Exam


Vital Signs: 


 Vital Signs











  Temp Pulse Pulse Resp BP BP Pulse Ox


 


 01/23/20 09:09   103 H    132/82  


 


 01/23/20 07:43  98.4 F   103 H  20   132/82  98


 


 01/22/20 23:30  98.5 F   100  18   111/70  98











Date Exam was Performed: 01/23/20


Time Exam was Performed: 09:23





- Problem List & Annotations


(1) S/P total knee arthroplasty


SNOMED Code(s): 4519964389860, 102769245, 8147519383665


   Code(s): Z96.659 - PRESENCE OF UNSPECIFIED ARTIFICIAL KNEE JOINT   Status: 

Acute   Priority: High   Current Visit: Yes   Onset Date: ~01/21/20   


Qualifiers: 


   Laterality: left   Qualified Code(s): Z96.652 - Presence of left artificial 

knee joint   





(2) Atrial fibrillation


SNOMED Code(s): 66496669


   Code(s): I48.91 - UNSPECIFIED ATRIAL FIBRILLATION   Status: Acute   Current 

Visit: Yes   





(3) Hypertension


SNOMED Code(s): 63948887


   Code(s): I10 - ESSENTIAL (PRIMARY) HYPERTENSION   Status: Acute   Current 

Visit: Yes   





(4) History of alcohol dependence


SNOMED Code(s): 451010443


   Code(s): F10.21 - ALCOHOL DEPENDENCE, IN REMISSION   Status: Acute   Current 

Visit: Yes   





(5) Depression


SNOMED Code(s): 31152645


   Code(s): F32.9 - MAJOR DEPRESSIVE DISORDER, SINGLE EPISODE, UNSPECIFIED   

Status: Acute   Current Visit: Yes   





(6) Asthma


SNOMED Code(s): 949014349


   Code(s): J45.909 - UNSPECIFIED ASTHMA, UNCOMPLICATED   Status: Acute   

Current Visit: Yes   





(7) Bronchitis


SNOMED Code(s): 10035414


   Code(s): J40 - BRONCHITIS, NOT SPECIFIED AS ACUTE OR CHRONIC   Status: Acute

   Current Visit: Yes   





- Problem List Review


Problem List Initiated/Reviewed/Updated: Yes





- My Orders


Last 24 Hours: 


My Active Orders





01/23/20 09:20


Azithromycin [Zithromax]   500 mg PO ONETIME ONE 





01/23/20 09:22


Triamcinolone Acetonide [Kenalog-40]   60 mg IM ONETIME ONE 





01/24/20 09:00


Azithromycin [Zithromax]   250 mg PO DAILY 














- Plan


Plan:: 


1. Kenalog 60 mg IM.


2. Zithromax 500 mg today then 250 mg a day for 4 days.


3. Continue current care.

## 2020-01-24 ENCOUNTER — HOSPITAL ENCOUNTER (INPATIENT)
Dept: HOSPITAL 52 - LL.MS | Age: 55
LOS: 12 days | Discharge: HOME | DRG: 560 | End: 2020-02-05
Attending: FAMILY MEDICINE | Admitting: FAMILY MEDICINE
Payer: MEDICARE

## 2020-01-24 DIAGNOSIS — Z79.82: ICD-10-CM

## 2020-01-24 DIAGNOSIS — Z90.49: ICD-10-CM

## 2020-01-24 DIAGNOSIS — F41.8: ICD-10-CM

## 2020-01-24 DIAGNOSIS — Z47.1: Primary | ICD-10-CM

## 2020-01-24 DIAGNOSIS — M25.569: ICD-10-CM

## 2020-01-24 DIAGNOSIS — K21.9: ICD-10-CM

## 2020-01-24 DIAGNOSIS — E78.2: ICD-10-CM

## 2020-01-24 DIAGNOSIS — M17.0: ICD-10-CM

## 2020-01-24 DIAGNOSIS — M05.79: ICD-10-CM

## 2020-01-24 DIAGNOSIS — M54.9: ICD-10-CM

## 2020-01-24 DIAGNOSIS — G89.29: ICD-10-CM

## 2020-01-24 DIAGNOSIS — I48.91: ICD-10-CM

## 2020-01-24 DIAGNOSIS — Z71.6: ICD-10-CM

## 2020-01-24 DIAGNOSIS — Z96.652: ICD-10-CM

## 2020-01-24 DIAGNOSIS — M54.2: ICD-10-CM

## 2020-01-24 DIAGNOSIS — Z79.899: ICD-10-CM

## 2020-01-24 DIAGNOSIS — Z51.5: ICD-10-CM

## 2020-01-24 DIAGNOSIS — I10: ICD-10-CM

## 2020-01-24 DIAGNOSIS — F31.32: ICD-10-CM

## 2020-01-24 DIAGNOSIS — D62: ICD-10-CM

## 2020-01-24 DIAGNOSIS — F51.04: ICD-10-CM

## 2020-01-24 DIAGNOSIS — F10.230: ICD-10-CM

## 2020-01-24 DIAGNOSIS — J44.1: ICD-10-CM

## 2020-01-24 DIAGNOSIS — E03.9: ICD-10-CM

## 2020-01-24 DIAGNOSIS — F17.210: ICD-10-CM

## 2020-01-24 RX ADMIN — OXYCODONE HYDROCHLORIDE AND ACETAMINOPHEN PRN TAB: 5; 325 TABLET ORAL at 03:07

## 2020-01-24 RX ADMIN — VITAMIN D, TAB 1000IU (100/BT) SCH MCG: 25 TAB at 08:33

## 2020-01-24 RX ADMIN — Medication SCH CAP: at 08:29

## 2020-01-24 RX ADMIN — VITAMIN C SCH EACH: TAB at 08:33

## 2020-01-24 RX ADMIN — TIOTROPIUM BROMIDE SCH: 18 CAPSULE ORAL; RESPIRATORY (INHALATION) at 08:31

## 2020-01-24 RX ADMIN — ASPIRIN SCH MG: 325 TABLET, DELAYED RELEASE ORAL at 08:28

## 2020-01-24 RX ADMIN — MOMETASONE FUROATE AND FORMOTEROL FUMARATE DIHYDRATE SCH PUFF: 200; 5 AEROSOL RESPIRATORY (INHALATION) at 08:28

## 2020-01-24 NOTE — [id number]
**Discharge Summary





- Hospital Course


HPI Initial Comments: 





53 yo male left knee primary oa s/p left tka


Diagnosis: Stroke: No





- Discharge Data


Discharge Date: 01/24/20


Discharge Disposition: DC/Tfer to SNF [facility]


Condition: Good





- Referral to Home Health


Primary Care Physician: 


Zahida Burns MD








- Discharge Diagnosis/Problem(s)


(1) S/P total knee arthroplasty


SNOMED Code(s): 4300181218498, [id number], [id number]


   ICD Code: Z96.659 - PRESENCE OF UNSPECIFIED ARTIFICIAL KNEE JOINT   Status: 

Acute   Priority: High   Current Visit: Yes   Onset Date: ~01/21/20   


Qualifiers: 


   Laterality: left   Qualified Code(s): Z96.652 - Presence of left artificial 

knee joint   





- Patient Summary/Data


Operative Procedure(s) Performed: left tka


Complications: none


Consults: 


 Consultations





01/21/20 11:45


Consult to Physician [CONS] Routine 


   Consulting Provider: Ar Castillo Call Completed to Consulting Physician: Yes


Respiratory Care Assess and Treatment [CONS] Routine 


   Comment: 


   Physician Instructions: Post-op Pneumonia Prevention





01/21/20 15:00


OT Evaluation and Treatment [CONS] Routine 


   Please Evaluate and Treat.


   OT Reason for Consult: Strengthening


   This query below is only for informational purposes and is not editable.


   Admission Diagnosis/Problem: Knee joint operation


PT Evaluation and Treatment [CONS] Routine 


   Please Evaluate and Treat.


   PT Reason for Consult: Strengthening


   This query below is only for informational purposes and is not editable.


   Admission Diagnosis/Problem: Knee joint operation











Recommended Follow-up Testing/Procedures: 





follow up in [facility] 2/12 with Dr. Ureña





- Patient Instructions


Diet: Usual Diet as Tolerated


Activity: As Tolerated, Full Weight Bearing, No Strenuous Activities


Driving: Do Not Drive


Showering/Bathing: May Shower


Wound/Incision Care: Keep Operative Site/Wound Site Clean and Dry


Wound/Incision, Other: remove aquacell tuesday, daily dressing thereafter


Notify Provider of: Fever, Increased Pain, Swelling and Redness, Drainage, 

Nausea and/or Vomiting





- Discharge Plan


*PRESCRIPTION DRUG MONITORING PROGRAM REVIEWED*: Yes


*COPY OF PRESCRIPTION DRUG MONITORING REPORT IN PATIENT EZEQUIEL: No


Prescriptions/Med Rec: 


Acetaminophen/oxyCODONE [Percocet 325-5 MG] 1 tab PO Q6H PRN #56 tablet


 PRN Reason: PAIN


Aspirin [Ecotrin EC] 325 mg PO DAILY #21 tab.ec


Nicotine [Habitrol] 21 mg TOP DAILY 21 Days #21 patch


Home Medications: 


 Home Meds





Budesonide/Formoterol Fumarate [Symbicort 160-4.5 Mcg Inhaler] 2 puff INH DAILY 

01/20/20 [History]


Cholecalciferol (Vitamin D3) [Vitamin D3] 4,000 unit PO DAILY 01/20/20 [History]


Gemfibrozil [Lopid] 600 mg PO DAILY 01/20/20 [History]


Glucosamine/Chondro Jackson A [Glucosamine-Chondroitin Tab] 1 each PO BID 01/20/20 [

History]


Levothyroxine 25 mcg PO ACBREAKFAST 01/20/20 [History]


Methotrexate 20 mg PO WE 01/20/20 [History]


Metoprolol Succinate [Toprol XL] 25 mg PO DAILY 01/20/20 [History]


Nicotine [Nicoderm CQ] 42 mg TD DAILY 01/20/20 [History]


Omeprazole 40 mg PO DAILY 01/20/20 [History]


QUEtiapine [SEROquel] 100 mg PO TID 01/20/20 [History]


Sertraline [Zoloft] 200 mg PO DAILY 01/20/20 [History]


Tiotropium [Spiriva HandiHaler] 18 mcg INH DAILY 01/20/20 [History]


Vitamin B Complex [B Complex] 1 each PO DAILY 01/20/20 [History]


Zolpidem Tartrate [Ambien] 10 mg PO BEDTIME 01/20/20 [History]


atoMOXetine [Strattera] 60 mg PO DAILY 01/20/20 [History]


rOPINIRole [Requip] 1 mg PO BEDTIME 01/20/20 [History]


Baclofen 10 mg PO TID 01/21/20 [History]


Folic Acid 1 mg PO DAILY 01/21/20 [History]


Acetaminophen/oxyCODONE [Percocet 325-5 MG] 1 tab PO Q6H PRN #56 tablet 01/24/ 20 [Rx]


Aspirin [Ecotrin EC] 325 mg PO DAILY #21 tab.ec 01/24/20 [Rx]


Nicotine [Habitrol] 21 mg TOP DAILY 21 Days #21 patch 01/24/20 [Rx]








Patient Handouts:  Fall Prevention in Hospitals, Adult, Preventing Problems 

After Surgery, Deep Vein Thrombosis





- Discharge Summary/Plan Comment


DC Time >30 min.: Yes





- General Info


Date of Service: 01/24/20


Functional Status: Reports: Pain Controlled, Tolerating Diet, Ambulating, 

Urinating, Incentive Spirometry





- Review of Systems


General: Reports: No Symptoms


HEENT: Reports: No Symptoms


Pulmonary: Reports: No Symptoms


Cardiovascular: Reports: No Symptoms


Gastrointestinal: Reports: No Symptoms


Genitourinary: Reports: No Symptoms


Musculoskeletal: Reports: Leg Pain, Joint Pain, Joint Swelling


Skin: Reports: No Symptoms


Neurological: Reports: No Symptoms


Psychiatric: Reports: No Symptoms





- Patient Data


Vitals - Most Recent: 


 Last Vital Signs











Temp  99.1 F   01/24/20 01:00


 


Pulse  103 H  01/24/20 08:32


 


Resp  16   01/23/20 23:45


 


BP  120/76   01/24/20 08:32


 


Pulse Ox  96   01/23/20 23:45











Weight - Most Recent: 154 lb 1.65 oz


I&O - Last 24 hours: 


 Intake & Output











 01/23/20 01/24/20 01/24/20





 22:59 06:59 14:59


 


Intake Total 1012 1216 


 


Output Total 1310 1180 


 


Balance -298 36 











Lab Results - Last 24 hrs: 


 Laboratory Results - last 24 hr











  01/23/20 01/24/20 01/24/20 Range/Units





  12:25 06:20 06:20 


 


WBC   7.6   (4.5-12.0)  X10-3/uL


 


RBC   2.48 L   (4.30-5.75)  x10(6)uL


 


Hgb   7.0 L   (13.5-17.8)  g/dL


 


Hct   21.1 L*   (30.0-51.3)  %


 


MCV   85.0   (80-96)  fL


 


MCH   28.1   (27.7-33.6)  pg


 


MCHC   33.1   (32.2-35.4)  g/dL


 


RDW   16.8 H   (11.5-15.5)  %


 


Plt Count   163   (125-369)  X10(3)uL


 


MPV   8.2   (7.4-10.4)  fL


 


Neut % (Auto)   79.1   (46-82)  %


 


Lymph % (Auto)   16.3   (13-37)  %


 


Mono % (Auto)   3.2 L   (4-12)  %


 


Eos % (Auto)   1   (1.0-5.0)  %


 


Baso % (Auto)   0   (0-2)  %


 


Neut # (Auto)   6.1   (1.6-8.3)  #


 


Lymph # (Auto)   1.2   (0.6-5.0)  #


 


Mono # (Auto)   0.2   (0.0-1.3)  #


 


Eos # (Auto)   0.1   (0.0-0.8)  #


 


Baso # (Auto)   0.0   (0.0-0.2)  #


 


Sodium    142  (135-145)  mmol/L


 


Potassium    3.6  (3.5-5.3)  mmol/L


 


Chloride    107  (100-110)  mmol/L


 


Carbon Dioxide    26  (21-32)  mmol/L


 


BUN    9  (7-18)  mg/dL


 


Creatinine    0.6 L  (0.70-1.30)  mg/dL


 


Est Cr Clr Drug Dosing    134.58  mL/min


 


Estimated GFR (MDRD)    > 60  (>60)  


 


BUN/Creatinine Ratio    15.0  (9-20)  


 


Glucose    98  ()  mg/dL


 


Calcium    8.4 L  (8.6-10.2)  mg/dL


 


Total Bilirubin    0.4  (0.1-1.3)  mg/dL


 


AST    22  (5-25)  IU/L


 


ALT    17  (12-36)  U/L


 


Alkaline Phosphatase    65  ()  IU/L


 


Troponin I  < 0.017 L    (<0.017-0.056)  ng/mL


 


Total Protein    6.2  (6.0-8.0)  g/dL


 


Albumin    2.6 L  (3.5-5.2)  g/dL


 


Globulin    3.6  g/dL


 


Albumin/Globulin Ratio    0.7  











Med Orders - Current: 


 Current Medications





Aspirin (Ecotrin)  325 mg PO DAILY [facility]


   Last Admin: 01/24/20 08:28 Dose:  325 mg


Atomoxetine HCl (Strattera)  60 mg PO DAILY [facility]


   Last Admin: 01/24/20 08:32 Dose:  60 mg


Bisacodyl (Dulcolax)  10 mg PO DAILY PRN


   PRN Reason: Constipation


Cephalexin (Keflex)  500 mg PO TID [facility]


   Last Admin: 01/24/20 08:30 Dose:  500 mg


Cholecalciferol (Vitamin D3)  100 mcg PO DAILY [facility]


   Last Admin: 01/24/20 08:33 Dose:  100 mcg


Diazepam (Valium)  2 mg IVPUSH Q6H PRN


   PRN Reason: Agitation


Diphenhydramine HCl (Benadryl)  25 mg IVPUSH Q4H PRN


   PRN Reason: Itching


Docusate Sodium (Colace)  100 mg PO BID PRN


   PRN Reason: Constipation


   Last Admin: 01/23/20 07:42 Dose:  100 mg


Ferrous Sulfate (Ferrous Sulfate)  325 mg PO WITHBREAKFAST [facility]


   Last Admin: 01/24/20 08:27 Dose:  325 mg


Gabapentin (Neurontin)  300 mg PO TID [facility]


   Last Admin: 01/24/20 08:31 Dose:  Not Given


Gemfibrozil (Lopid)  600 mg PO DAILY [facility]


   Last Admin: 01/24/20 08:31 Dose:  600 mg


Glucosamine/Chondroitin (Glucosamine-Chondroitin 500-400 Capsule)  1 cap PO BID 

[facility]


   Last Admin: 01/24/20 08:29 Dose:  1 cap


Hydroxyzine HCl (Atarax)  25 mg PO Q8H [facility]


   Last Admin: 01/24/20 03:07 Dose:  25 mg


Levothyroxine Sodium (Levothyroxine)  25 mcg PO ACBREAKFAST [facility]


   Last Admin: 01/24/20 06:50 Dose:  25 mcg


Magnesium Hydroxide (Milk Of Magnesia)  30 ml PO BID PRN


   PRN Reason: Constipation


Methotrexate (Methotrexate)  20 mg PO WE [facility]


   Last Admin: 01/22/20 08:47 Dose:  20 mg


Metoprolol Succinate (Toprol Xl)  25 mg PO DAILY [facility]


   Last Admin: 01/24/20 08:32 Dose:  25 mg


Mometasone Furoate/Formoterol Fumar (Dulera 200-5 Mcg)  2 puff IH DAILY [facility]


   Last Admin: 01/24/20 08:28 Dose:  2 puff


Morphine Sulfate (Morphine)  2 mg IVPUSH Q2H PRN


   PRN Reason: Breakthrough Pain


   Last Admin: 01/23/20 17:40 Dose:  2 mg


Multivitamins (Total B With C)  1 each PO DAILY [facility]


   Last Admin: 01/24/20 08:33 Dose:  1 each


Naloxone HCl (Narcan)  0.1 mg IVPUSH ONETIME PRN


   PRN Reason: Oversedation


Nicotine (Habitrol)  21 mg TOP DAILY [facility]


   Last Admin: 01/24/20 08:29 Dose:  21 mg


Ondansetron HCl (Zofran)  4 mg IVPUSH Q4H PRN


   PRN Reason: Nausea/Vomiting


   Last Admin: 01/23/20 07:39 Dose:  4 mg


Oxycodone/Acetaminophen (Percocet 325-5 Mg)  2 tab PO Q6H PRN


   PRN Reason: PAIN


   Last Admin: 01/24/20 03:07 Dose:  2 tab


Pantoprazole Sodium (Protonix***)  40 mg PO DAILY [facility]


   Last Admin: 01/24/20 08:31 Dose:  40 mg


Quetiapine Fumarate (Seroquel)  100 mg PO TID [facility]


   Last Admin: 01/24/20 08:31 Dose:  100 mg


Ropinirole HCl (Requip)  1 mg PO BEDTIME [facility]


   Last Admin: 01/23/20 20:36 Dose:  1 mg


Senna (Senna)  8.6 mg PO BID PRN


   PRN Reason: Constipation


Sertraline HCl (Zoloft)  200 mg PO DAILY [facility]


   Last Admin: 01/24/20 08:34 Dose:  200 mg


Sodium Chloride (Saline Flush)  10 ml FLUSH ASDIRECTED PRN


   PRN Reason: Keep Vein Open


   Last Admin: 01/23/20 22:00 Dose:  10 ml


Sodium Chloride (Saline Flush)  10 ml FLUSH ASDIRECTED PRN


   PRN Reason: Keep Vein Open


Tiotropium Bromide (Spiriva Handihaler)  0 mcg INH DAILY [facility]


   Last Admin: 01/24/20 08:31 Dose:  Not Given


Tramadol HCl (Ultram)  100 mg PO Q6H PRN


   PRN Reason: Pain (mild 1-3)


   Last Admin: 01/24/20 08:36 Dose:  100 mg


Zolpidem Tartrate (Ambien)  5 mg PO BEDTIME PRN


   PRN Reason: Sleep


   Last Admin: 01/23/20 20:40 Dose:  5 mg





Discontinued Medications





Acetaminophen (Tylenol)  650 mg PO NOW STA


   Stop: 01/23/20 23:50


   Last Admin: 01/24/20 00:02 Dose:  650 mg


Acetaminophen (Tylenol Extra Strength)  1,000 mg PO ONETIME ONE


   Stop: 01/21/20 07:16


   Last Admin: 01/21/20 08:36 Dose:  1,000 mg


Acetaminophen (Tylenol Arthritis Pain)  650 mg PO QID [facility]


   Last Admin: 01/23/20 09:07 Dose:  650 mg


Azithromycin (Zithromax)  500 mg PO ONETIME ONE


   Stop: 01/23/20 09:21


   Last Admin: 01/23/20 11:37 Dose:  Not Given


Cefazolin Sodium (Ancef)  1 gm IVPUSH ONETIME ONE


   Stop: 01/21/20 09:01


   Last Admin: 01/21/20 08:49 Dose:  1 gm


Ropivacaine 49.25 ml/Ketorolac Tromethamine 30 mg/Epinephrine HCl 0.5 mg/

Clonidine HCl 80 mcg/ Sodium Chloride 48.45 ml  0 ml INJECT ASDIRECTED ONE


   Stop: 01/21/20 09:01


   Last Admin: 01/21/20 11:42 Dose:  100 syringe


Tranexamic Acid 3,000 mg/ (Sodium Chloride 100 ml)  0 mg IRR ONETIME ONE


   Stop: 01/21/20 09:01


   Last Admin: 01/21/20 11:44 Dose:  100 irr


Diazepam (Valium)  5 mg IVPUSH Q6H PRN


   PRN Reason: Spasms


Gabapentin (Neurontin)  300 mg PO ONETIME ONE


   Stop: 01/21/20 07:16


   Last Admin: 01/21/20 08:36 Dose:  300 mg


Hydroxyzine Pamoate (Vistaril)  25 mg PO Q8H [facility]


   Last Admin: 01/22/20 12:44 Dose:  Not Given


Lactated Ringer's (Ringers, Lactated)  1,000 mls @ 125 mls/hr IV ASDIRECTED [facility]


   Last Admin: 01/21/20 19:02 Dose:  125 mls/hr


Lactated Ringer's (Ringers, Lactated)  1,000 mls @ 125 mls/hr IV ASDIRECTED [facility]


   Last Infusion: 01/22/20 11:13 Dose:  Infused


Lactated Ringer's (Ringers, Lactated)  500 mls @ 250 mls/hr IV BOLUS SHARONA


Lactated Ringer's (Ringers, Lactated)  1,000 mls @ 75 mls/hr IV ASDIRECTED [facility]


   Last Admin: 01/24/20 05:01 Dose:  75 mls/hr


Oxycodone/Acetaminophen (Percocet 325-5 Mg)  2 tab PO Q4H PRN


   PRN Reason: Pain (moderate 4-6)


   Last Admin: 01/23/20 05:59 Dose:  2 tab


Scopolamine (Transderm-Scop)  1.5 mg TRDERM ONETIME ONE


   Stop: 01/21/20 07:16


   Last Admin: 01/21/20 08:36 Dose:  1.5 mg


Triamcinolone Acetonide (Kenalog-40)  60 mg IM ONETIME ONE


   Stop: 01/23/20 09:23


   Last Admin: 01/23/20 10:27 Dose:  60 mg


Vancomycin HCl (Vancomycin)  1 gm .XX .STK-MED ONE


   Stop: 01/21/20 09:59


   Last Admin: 01/21/20 09:58 Dose:  1 gm











- Exam


Quality Assessment: Reports: DVT Prophylaxis


General: Reports: Alert, Oriented, Cooperative, No Acute Distress


HEENT: Reports: Pupils Equal, Pupils Reactive, EOMI, Mucous Membr. Moist/Pink


Neck: Reports: Supple, Trachea Midline


Lungs: Reports: Normal Respiratory Effort


GI/Abdominal Exam: No Distention


Extremities: Joint Swelling, Leg Pain, Limited Range of Motion


Skin: Reports: Warm, Dry, Intact


Wound/Incisions: Reports: Healing Well, Dressing Dry and Intact, No Drainage


Neurological: Reports: No New Focal Deficit


Psy/Mental Status: Reports: Alert, Normal Affect, Normal Mood





Discharge Operative/Procedures





- Procedures Performed


Operations: left tka

## 2020-01-25 LAB
CHLORIDE SERPL-SCNC: 103 MMOL/L (ref 98–107)
SODIUM SERPL-SCNC: 139 MMOL/L (ref 136–145)

## 2020-01-25 RX ADMIN — OXYCODONE HYDROCHLORIDE AND ACETAMINOPHEN PRN TAB: 5; 325 TABLET ORAL at 08:40

## 2020-01-25 RX ADMIN — OXYCODONE HYDROCHLORIDE AND ACETAMINOPHEN PRN TAB: 5; 325 TABLET ORAL at 15:25

## 2020-01-25 RX ADMIN — BENZOCAINE AND MENTHOL SCH MG: 15; 3.6 LOZENGE ORAL at 07:36

## 2020-01-25 RX ADMIN — TIOTROPIUM BROMIDE SCH INH: 18 CAPSULE ORAL; RESPIRATORY (INHALATION) at 07:37

## 2020-01-25 RX ADMIN — OXYCODONE HYDROCHLORIDE AND ACETAMINOPHEN PRN TAB: 5; 325 TABLET ORAL at 21:25

## 2020-01-25 RX ADMIN — OMEPRAZOLE SCH MG: 20 CAPSULE, DELAYED RELEASE ORAL at 07:36

## 2020-01-25 RX ADMIN — MOMETASONE FUROATE AND FORMOTEROL FUMARATE DIHYDRATE SCH INH: 200; 5 AEROSOL RESPIRATORY (INHALATION) at 17:37

## 2020-01-25 RX ADMIN — OXYCODONE HYDROCHLORIDE AND ACETAMINOPHEN PRN TAB: 5; 325 TABLET ORAL at 00:03

## 2020-01-25 RX ADMIN — MOMETASONE FUROATE AND FORMOTEROL FUMARATE DIHYDRATE SCH INH: 200; 5 AEROSOL RESPIRATORY (INHALATION) at 07:34

## 2020-01-25 RX ADMIN — VITAMIN D, TAB 1000IU (100/BT) SCH MCG: 25 TAB at 07:35

## 2020-01-26 RX ADMIN — MOMETASONE FUROATE AND FORMOTEROL FUMARATE DIHYDRATE SCH INH: 200; 5 AEROSOL RESPIRATORY (INHALATION) at 17:42

## 2020-01-26 RX ADMIN — OXYCODONE HYDROCHLORIDE AND ACETAMINOPHEN PRN TAB: 5; 325 TABLET ORAL at 09:29

## 2020-01-26 RX ADMIN — OXYCODONE HYDROCHLORIDE AND ACETAMINOPHEN PRN TAB: 5; 325 TABLET ORAL at 03:21

## 2020-01-26 RX ADMIN — OXYCODONE HYDROCHLORIDE AND ACETAMINOPHEN PRN TAB: 5; 325 TABLET ORAL at 15:31

## 2020-01-26 RX ADMIN — OMEPRAZOLE SCH MG: 20 CAPSULE, DELAYED RELEASE ORAL at 07:33

## 2020-01-26 RX ADMIN — BENZOCAINE AND MENTHOL SCH MG: 15; 3.6 LOZENGE ORAL at 07:32

## 2020-01-26 RX ADMIN — TIOTROPIUM BROMIDE SCH INH: 18 CAPSULE ORAL; RESPIRATORY (INHALATION) at 07:35

## 2020-01-26 RX ADMIN — MOMETASONE FUROATE AND FORMOTEROL FUMARATE DIHYDRATE SCH INH: 200; 5 AEROSOL RESPIRATORY (INHALATION) at 07:32

## 2020-01-26 RX ADMIN — VITAMIN D, TAB 1000IU (100/BT) SCH MCG: 25 TAB at 07:34

## 2020-01-27 RX ADMIN — OXYCODONE HYDROCHLORIDE AND ACETAMINOPHEN PRN TAB: 5; 325 TABLET ORAL at 15:45

## 2020-01-27 RX ADMIN — OXYCODONE HYDROCHLORIDE AND ACETAMINOPHEN PRN TAB: 5; 325 TABLET ORAL at 03:11

## 2020-01-27 RX ADMIN — BENZOCAINE AND MENTHOL SCH MG: 15; 3.6 LOZENGE ORAL at 08:03

## 2020-01-27 RX ADMIN — OXYCODONE HYDROCHLORIDE AND ACETAMINOPHEN PRN TAB: 5; 325 TABLET ORAL at 09:18

## 2020-01-27 RX ADMIN — OMEPRAZOLE SCH MG: 20 CAPSULE, DELAYED RELEASE ORAL at 08:04

## 2020-01-27 RX ADMIN — MOMETASONE FUROATE AND FORMOTEROL FUMARATE DIHYDRATE SCH INH: 200; 5 AEROSOL RESPIRATORY (INHALATION) at 17:58

## 2020-01-27 RX ADMIN — TIOTROPIUM BROMIDE SCH INH: 18 CAPSULE ORAL; RESPIRATORY (INHALATION) at 08:02

## 2020-01-27 RX ADMIN — VITAMIN D, TAB 1000IU (100/BT) SCH MCG: 25 TAB at 08:05

## 2020-01-27 RX ADMIN — MOMETASONE FUROATE AND FORMOTEROL FUMARATE DIHYDRATE SCH INH: 200; 5 AEROSOL RESPIRATORY (INHALATION) at 08:08

## 2020-01-28 RX ADMIN — OXYCODONE HYDROCHLORIDE AND ACETAMINOPHEN PRN TAB: 5; 325 TABLET ORAL at 15:43

## 2020-01-28 RX ADMIN — VITAMIN D, TAB 1000IU (100/BT) SCH MCG: 25 TAB at 07:40

## 2020-01-28 RX ADMIN — OMEPRAZOLE SCH MG: 20 CAPSULE, DELAYED RELEASE ORAL at 07:37

## 2020-01-28 RX ADMIN — TIOTROPIUM BROMIDE SCH INH: 18 CAPSULE ORAL; RESPIRATORY (INHALATION) at 07:39

## 2020-01-28 RX ADMIN — MOMETASONE FUROATE AND FORMOTEROL FUMARATE DIHYDRATE SCH INH: 200; 5 AEROSOL RESPIRATORY (INHALATION) at 18:08

## 2020-01-28 RX ADMIN — MOMETASONE FUROATE AND FORMOTEROL FUMARATE DIHYDRATE SCH INH: 200; 5 AEROSOL RESPIRATORY (INHALATION) at 07:36

## 2020-01-28 RX ADMIN — BENZOCAINE AND MENTHOL SCH MG: 15; 3.6 LOZENGE ORAL at 07:39

## 2020-01-29 RX ADMIN — MOMETASONE FUROATE AND FORMOTEROL FUMARATE DIHYDRATE SCH INH: 200; 5 AEROSOL RESPIRATORY (INHALATION) at 17:32

## 2020-01-29 RX ADMIN — MOMETASONE FUROATE AND FORMOTEROL FUMARATE DIHYDRATE SCH INH: 200; 5 AEROSOL RESPIRATORY (INHALATION) at 07:53

## 2020-01-29 RX ADMIN — OMEPRAZOLE SCH MG: 20 CAPSULE, DELAYED RELEASE ORAL at 07:51

## 2020-01-29 RX ADMIN — OXYCODONE HYDROCHLORIDE AND ACETAMINOPHEN PRN TAB: 5; 325 TABLET ORAL at 19:41

## 2020-01-29 RX ADMIN — TIOTROPIUM BROMIDE SCH INH: 18 CAPSULE ORAL; RESPIRATORY (INHALATION) at 07:50

## 2020-01-29 RX ADMIN — VITAMIN D, TAB 1000IU (100/BT) SCH MCG: 25 TAB at 07:49

## 2020-01-29 RX ADMIN — BENZOCAINE AND MENTHOL SCH MG: 15; 3.6 LOZENGE ORAL at 07:50

## 2020-01-30 RX ADMIN — OXYCODONE HYDROCHLORIDE AND ACETAMINOPHEN PRN TAB: 5; 325 TABLET ORAL at 02:58

## 2020-01-30 RX ADMIN — OMEPRAZOLE SCH MG: 20 CAPSULE, DELAYED RELEASE ORAL at 08:05

## 2020-01-30 RX ADMIN — MOMETASONE FUROATE AND FORMOTEROL FUMARATE DIHYDRATE SCH INH: 200; 5 AEROSOL RESPIRATORY (INHALATION) at 08:01

## 2020-01-30 RX ADMIN — OXYCODONE HYDROCHLORIDE AND ACETAMINOPHEN PRN TAB: 5; 325 TABLET ORAL at 21:08

## 2020-01-30 RX ADMIN — OXYCODONE HYDROCHLORIDE AND ACETAMINOPHEN PRN TAB: 5; 325 TABLET ORAL at 09:05

## 2020-01-30 RX ADMIN — MOMETASONE FUROATE AND FORMOTEROL FUMARATE DIHYDRATE SCH INH: 200; 5 AEROSOL RESPIRATORY (INHALATION) at 17:44

## 2020-01-30 RX ADMIN — VITAMIN D, TAB 1000IU (100/BT) SCH MCG: 25 TAB at 08:07

## 2020-01-30 RX ADMIN — TIOTROPIUM BROMIDE SCH INH: 18 CAPSULE ORAL; RESPIRATORY (INHALATION) at 08:11

## 2020-01-30 RX ADMIN — OXYCODONE HYDROCHLORIDE AND ACETAMINOPHEN PRN TAB: 5; 325 TABLET ORAL at 14:59

## 2020-01-30 RX ADMIN — BENZOCAINE AND MENTHOL SCH MG: 15; 3.6 LOZENGE ORAL at 08:02

## 2020-01-31 RX ADMIN — OXYCODONE HYDROCHLORIDE AND ACETAMINOPHEN PRN TAB: 5; 325 TABLET ORAL at 20:52

## 2020-01-31 RX ADMIN — MOMETASONE FUROATE AND FORMOTEROL FUMARATE DIHYDRATE SCH INH: 200; 5 AEROSOL RESPIRATORY (INHALATION) at 07:55

## 2020-01-31 RX ADMIN — OXYCODONE HYDROCHLORIDE AND ACETAMINOPHEN PRN TAB: 5; 325 TABLET ORAL at 03:03

## 2020-01-31 RX ADMIN — OXYCODONE HYDROCHLORIDE AND ACETAMINOPHEN PRN TAB: 5; 325 TABLET ORAL at 08:47

## 2020-01-31 RX ADMIN — OMEPRAZOLE SCH MG: 20 CAPSULE, DELAYED RELEASE ORAL at 07:52

## 2020-01-31 RX ADMIN — TIOTROPIUM BROMIDE SCH INH: 18 CAPSULE ORAL; RESPIRATORY (INHALATION) at 07:56

## 2020-01-31 RX ADMIN — BENZOCAINE AND MENTHOL SCH MG: 15; 3.6 LOZENGE ORAL at 07:56

## 2020-01-31 RX ADMIN — OXYCODONE HYDROCHLORIDE AND ACETAMINOPHEN PRN TAB: 5; 325 TABLET ORAL at 15:08

## 2020-01-31 RX ADMIN — MOMETASONE FUROATE AND FORMOTEROL FUMARATE DIHYDRATE SCH INH: 200; 5 AEROSOL RESPIRATORY (INHALATION) at 17:33

## 2020-01-31 RX ADMIN — VITAMIN D, TAB 1000IU (100/BT) SCH MCG: 25 TAB at 07:51

## 2020-02-01 RX ADMIN — OXYCODONE HYDROCHLORIDE AND ACETAMINOPHEN PRN TAB: 5; 325 TABLET ORAL at 15:30

## 2020-02-01 RX ADMIN — TIOTROPIUM BROMIDE SCH INH: 18 CAPSULE ORAL; RESPIRATORY (INHALATION) at 07:33

## 2020-02-01 RX ADMIN — OMEPRAZOLE SCH MG: 20 CAPSULE, DELAYED RELEASE ORAL at 07:36

## 2020-02-01 RX ADMIN — MOMETASONE FUROATE AND FORMOTEROL FUMARATE DIHYDRATE SCH INH: 200; 5 AEROSOL RESPIRATORY (INHALATION) at 07:32

## 2020-02-01 RX ADMIN — OXYCODONE HYDROCHLORIDE AND ACETAMINOPHEN PRN TAB: 5; 325 TABLET ORAL at 03:34

## 2020-02-01 RX ADMIN — BENZOCAINE AND MENTHOL SCH MG: 15; 3.6 LOZENGE ORAL at 07:33

## 2020-02-01 RX ADMIN — OXYCODONE HYDROCHLORIDE AND ACETAMINOPHEN PRN TAB: 5; 325 TABLET ORAL at 09:09

## 2020-02-01 RX ADMIN — VITAMIN D, TAB 1000IU (100/BT) SCH MCG: 25 TAB at 07:35

## 2020-02-01 RX ADMIN — MOMETASONE FUROATE AND FORMOTEROL FUMARATE DIHYDRATE SCH INH: 200; 5 AEROSOL RESPIRATORY (INHALATION) at 18:18

## 2020-02-02 RX ADMIN — BENZOCAINE AND MENTHOL SCH MG: 15; 3.6 LOZENGE ORAL at 08:52

## 2020-02-02 RX ADMIN — OMEPRAZOLE SCH MG: 20 CAPSULE, DELAYED RELEASE ORAL at 08:46

## 2020-02-02 RX ADMIN — MOMETASONE FUROATE AND FORMOTEROL FUMARATE DIHYDRATE SCH INH: 200; 5 AEROSOL RESPIRATORY (INHALATION) at 08:48

## 2020-02-02 RX ADMIN — TIOTROPIUM BROMIDE SCH INH: 18 CAPSULE ORAL; RESPIRATORY (INHALATION) at 08:47

## 2020-02-02 RX ADMIN — MOMETASONE FUROATE AND FORMOTEROL FUMARATE DIHYDRATE SCH INH: 200; 5 AEROSOL RESPIRATORY (INHALATION) at 17:40

## 2020-02-02 RX ADMIN — VITAMIN D, TAB 1000IU (100/BT) SCH MCG: 25 TAB at 08:46

## 2020-02-03 RX ADMIN — BENZOCAINE AND MENTHOL SCH MG: 15; 3.6 LOZENGE ORAL at 07:31

## 2020-02-03 RX ADMIN — OMEPRAZOLE SCH MG: 20 CAPSULE, DELAYED RELEASE ORAL at 07:35

## 2020-02-03 RX ADMIN — MOMETASONE FUROATE AND FORMOTEROL FUMARATE DIHYDRATE SCH INH: 200; 5 AEROSOL RESPIRATORY (INHALATION) at 07:40

## 2020-02-03 RX ADMIN — VITAMIN D, TAB 1000IU (100/BT) SCH MCG: 25 TAB at 07:34

## 2020-02-03 RX ADMIN — MOMETASONE FUROATE AND FORMOTEROL FUMARATE DIHYDRATE SCH INH: 200; 5 AEROSOL RESPIRATORY (INHALATION) at 17:39

## 2020-02-03 RX ADMIN — TIOTROPIUM BROMIDE SCH INH: 18 CAPSULE ORAL; RESPIRATORY (INHALATION) at 07:40

## 2020-02-04 RX ADMIN — VITAMIN D, TAB 1000IU (100/BT) SCH MCG: 25 TAB at 07:28

## 2020-02-04 RX ADMIN — OMEPRAZOLE SCH MG: 20 CAPSULE, DELAYED RELEASE ORAL at 07:30

## 2020-02-04 RX ADMIN — BENZOCAINE AND MENTHOL SCH MG: 15; 3.6 LOZENGE ORAL at 07:33

## 2020-02-04 RX ADMIN — MOMETASONE FUROATE AND FORMOTEROL FUMARATE DIHYDRATE SCH INH: 200; 5 AEROSOL RESPIRATORY (INHALATION) at 17:31

## 2020-02-04 RX ADMIN — TIOTROPIUM BROMIDE SCH INH: 18 CAPSULE ORAL; RESPIRATORY (INHALATION) at 07:31

## 2020-02-04 RX ADMIN — MOMETASONE FUROATE AND FORMOTEROL FUMARATE DIHYDRATE SCH INH: 200; 5 AEROSOL RESPIRATORY (INHALATION) at 07:30

## 2020-02-05 VITALS — DIASTOLIC BLOOD PRESSURE: 84 MMHG | HEART RATE: 81 BPM | SYSTOLIC BLOOD PRESSURE: 162 MMHG

## 2020-02-05 RX ADMIN — VITAMIN D, TAB 1000IU (100/BT) SCH MCG: 25 TAB at 07:49

## 2020-02-05 RX ADMIN — TIOTROPIUM BROMIDE SCH INH: 18 CAPSULE ORAL; RESPIRATORY (INHALATION) at 07:54

## 2020-02-05 RX ADMIN — OMEPRAZOLE SCH MG: 20 CAPSULE, DELAYED RELEASE ORAL at 07:51

## 2020-02-05 RX ADMIN — MOMETASONE FUROATE AND FORMOTEROL FUMARATE DIHYDRATE SCH INH: 200; 5 AEROSOL RESPIRATORY (INHALATION) at 07:49

## 2020-02-05 RX ADMIN — BENZOCAINE AND MENTHOL SCH MG: 15; 3.6 LOZENGE ORAL at 07:48

## 2020-02-17 ENCOUNTER — HOSPITAL ENCOUNTER (EMERGENCY)
Dept: HOSPITAL 52 - LL.ED | Age: 55
Discharge: HOME | End: 2020-02-17
Payer: MEDICARE

## 2020-02-17 VITALS — SYSTOLIC BLOOD PRESSURE: 142 MMHG | DIASTOLIC BLOOD PRESSURE: 85 MMHG | HEART RATE: 91 BPM

## 2020-02-17 DIAGNOSIS — Z79.890: ICD-10-CM

## 2020-02-17 DIAGNOSIS — I10: ICD-10-CM

## 2020-02-17 DIAGNOSIS — E78.00: ICD-10-CM

## 2020-02-17 DIAGNOSIS — Z79.51: ICD-10-CM

## 2020-02-17 DIAGNOSIS — F41.9: ICD-10-CM

## 2020-02-17 DIAGNOSIS — Z79.82: ICD-10-CM

## 2020-02-17 DIAGNOSIS — J44.9: ICD-10-CM

## 2020-02-17 DIAGNOSIS — E03.9: ICD-10-CM
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
## 2020-02-17 DIAGNOSIS — F32.9: ICD-10-CM

## 2020-02-17 DIAGNOSIS — F17.210: ICD-10-CM

## 2020-02-17 DIAGNOSIS — M96.840: Primary | ICD-10-CM

## 2020-02-17 DIAGNOSIS — Z79.899: ICD-10-CM

## 2020-02-17 DIAGNOSIS — K21.9: ICD-10-CM

## 2020-02-19 ENCOUNTER — HOSPITAL ENCOUNTER (INPATIENT)
Dept: HOSPITAL 52 - LL.ED | Age: 55
LOS: 3 days | Discharge: NURSING, REHAB OR LONG-TERM CARE | DRG: 558 | End: 2020-02-22
Attending: FAMILY MEDICINE | Admitting: FAMILY MEDICINE
Payer: MEDICARE

## 2020-02-19 DIAGNOSIS — I48.0: ICD-10-CM

## 2020-02-19 DIAGNOSIS — I27.20: ICD-10-CM

## 2020-02-19 DIAGNOSIS — Z71.6: ICD-10-CM

## 2020-02-19 DIAGNOSIS — M19.90: ICD-10-CM

## 2020-02-19 DIAGNOSIS — I10: ICD-10-CM

## 2020-02-19 DIAGNOSIS — M05.79: ICD-10-CM

## 2020-02-19 DIAGNOSIS — T84.033A: ICD-10-CM

## 2020-02-19 DIAGNOSIS — Z86.14: ICD-10-CM

## 2020-02-19 DIAGNOSIS — F10.10: ICD-10-CM

## 2020-02-19 DIAGNOSIS — F41.8: ICD-10-CM

## 2020-02-19 DIAGNOSIS — Z51.5: ICD-10-CM

## 2020-02-19 DIAGNOSIS — Z98.52: ICD-10-CM

## 2020-02-19 DIAGNOSIS — Z22.322: ICD-10-CM

## 2020-02-19 DIAGNOSIS — J43.1: ICD-10-CM

## 2020-02-19 DIAGNOSIS — E83.42: ICD-10-CM

## 2020-02-19 DIAGNOSIS — E03.9: ICD-10-CM

## 2020-02-19 DIAGNOSIS — F31.9: ICD-10-CM

## 2020-02-19 DIAGNOSIS — R74.0: ICD-10-CM

## 2020-02-19 DIAGNOSIS — Z79.890: ICD-10-CM

## 2020-02-19 DIAGNOSIS — K70.0: ICD-10-CM

## 2020-02-19 DIAGNOSIS — F17.210: ICD-10-CM

## 2020-02-19 DIAGNOSIS — Z91.19: ICD-10-CM

## 2020-02-19 DIAGNOSIS — E78.2: ICD-10-CM

## 2020-02-19 DIAGNOSIS — R79.89: ICD-10-CM

## 2020-02-19 DIAGNOSIS — Z79.899: ICD-10-CM

## 2020-02-19 DIAGNOSIS — Z90.49: ICD-10-CM

## 2020-02-19 DIAGNOSIS — G89.29: ICD-10-CM

## 2020-02-19 DIAGNOSIS — F10.20: ICD-10-CM

## 2020-02-19 DIAGNOSIS — E78.00: ICD-10-CM

## 2020-02-19 DIAGNOSIS — M54.2: ICD-10-CM

## 2020-02-19 DIAGNOSIS — M17.0: ICD-10-CM

## 2020-02-19 DIAGNOSIS — Z87.19: ICD-10-CM

## 2020-02-19 DIAGNOSIS — A49.02: Primary | ICD-10-CM

## 2020-02-19 DIAGNOSIS — L03.116: ICD-10-CM

## 2020-02-19 DIAGNOSIS — K21.9: ICD-10-CM

## 2020-02-19 DIAGNOSIS — N40.0: ICD-10-CM

## 2020-02-19 DIAGNOSIS — Z96.652: ICD-10-CM

## 2020-02-19 DIAGNOSIS — M71.22: ICD-10-CM

## 2020-02-19 DIAGNOSIS — K59.09: ICD-10-CM

## 2020-02-19 DIAGNOSIS — B95.62: ICD-10-CM

## 2020-02-19 DIAGNOSIS — M54.9: ICD-10-CM

## 2020-02-19 DIAGNOSIS — D50.8: ICD-10-CM

## 2020-02-19 DIAGNOSIS — D64.9: ICD-10-CM

## 2020-02-19 DIAGNOSIS — Z79.82: ICD-10-CM

## 2020-02-19 DIAGNOSIS — Z86.19: ICD-10-CM

## 2020-02-19 LAB
CHLORIDE SERPL-SCNC: 111 MMOL/L (ref 98–107)
SODIUM SERPL-SCNC: 147 MMOL/L (ref 136–145)

## 2020-02-19 RX ADMIN — BUDESONIDE SCH MG: 0.5 SUSPENSION RESPIRATORY (INHALATION) at 19:29

## 2020-02-19 RX ADMIN — Medication SCH: at 14:08

## 2020-02-19 RX ADMIN — Medication PRN ML: at 11:27

## 2020-02-19 RX ADMIN — Medication PRN ML: at 14:05

## 2020-02-19 RX ADMIN — NEOMYCIN AND POLYMYXIN B SULFATES AND BACITRACIN ZINC SCH: 400; 3.5; 5 OINTMENT TOPICAL at 17:00

## 2020-02-19 RX ADMIN — Medication SCH ML: at 23:17

## 2020-02-19 RX ADMIN — Medication PRN ML: at 15:35

## 2020-02-19 RX ADMIN — KETOROLAC TROMETHAMINE PRN MG: 15 INJECTION, SOLUTION INTRAMUSCULAR; INTRAVENOUS at 14:04

## 2020-02-19 RX ADMIN — NEOMYCIN AND POLYMYXIN B SULFATES AND BACITRACIN ZINC SCH EACH: 400; 3.5; 5 OINTMENT TOPICAL at 14:09

## 2020-02-20 LAB
CHLORIDE SERPL-SCNC: 105 MMOL/L (ref 98–107)
SODIUM SERPL-SCNC: 139 MMOL/L (ref 136–145)

## 2020-02-20 RX ADMIN — ONDANSETRON PRN MG: 2 INJECTION, SOLUTION INTRAMUSCULAR; INTRAVENOUS at 22:20

## 2020-02-20 RX ADMIN — BUDESONIDE SCH MG: 0.5 SUSPENSION RESPIRATORY (INHALATION) at 19:55

## 2020-02-20 RX ADMIN — BUDESONIDE SCH MG: 0.5 SUSPENSION RESPIRATORY (INHALATION) at 07:16

## 2020-02-20 RX ADMIN — Medication SCH ML: at 22:21

## 2020-02-20 RX ADMIN — Medication SCH ML: at 12:02

## 2020-02-20 RX ADMIN — Medication SCH CAP: at 17:12

## 2020-02-20 RX ADMIN — KETOROLAC TROMETHAMINE PRN MG: 15 INJECTION, SOLUTION INTRAMUSCULAR; INTRAVENOUS at 05:19

## 2020-02-20 RX ADMIN — NEOMYCIN AND POLYMYXIN B SULFATES AND BACITRACIN ZINC SCH EACH: 400; 3.5; 5 OINTMENT TOPICAL at 07:19

## 2020-02-20 RX ADMIN — Medication PRN ML: at 10:37

## 2020-02-20 RX ADMIN — Medication SCH EA: at 12:02

## 2020-02-20 RX ADMIN — NEOMYCIN AND POLYMYXIN B SULFATES AND BACITRACIN ZINC SCH EACH: 400; 3.5; 5 OINTMENT TOPICAL at 19:56

## 2020-02-20 RX ADMIN — Medication PRN ML: at 17:13

## 2020-02-20 RX ADMIN — BENZOCAINE AND MENTHOL SCH MG: 15; 3.6 LOZENGE ORAL at 08:43

## 2020-02-20 RX ADMIN — OMEPRAZOLE SCH MG: 20 CAPSULE, DELAYED RELEASE ORAL at 07:16

## 2020-02-20 RX ADMIN — Medication SCH CAP: at 10:32

## 2020-02-21 LAB
BARBITURATES UR QL SCN: NEGATIVE
BENZODIAZ UR QL SCN: POSITIVE
CHLORIDE SERPL-SCNC: 106 MMOL/L (ref 98–107)
EDDP UR QL: NEGATIVE
SODIUM SERPL-SCNC: 140 MMOL/L (ref 136–145)
TCA UR-MCNC: NEGATIVE UG/ML
THC UR QL SCN>50 NG/ML: NEGATIVE

## 2020-02-21 RX ADMIN — Medication SCH EA: at 11:47

## 2020-02-21 RX ADMIN — Medication PRN ML: at 20:56

## 2020-02-21 RX ADMIN — BUDESONIDE SCH MG: 0.5 SUSPENSION RESPIRATORY (INHALATION) at 20:53

## 2020-02-21 RX ADMIN — BENZOCAINE AND MENTHOL SCH MG: 15; 3.6 LOZENGE ORAL at 07:57

## 2020-02-21 RX ADMIN — NEOMYCIN AND POLYMYXIN B SULFATES AND BACITRACIN ZINC SCH EACH: 400; 3.5; 5 OINTMENT TOPICAL at 07:59

## 2020-02-21 RX ADMIN — BUDESONIDE SCH MG: 0.5 SUSPENSION RESPIRATORY (INHALATION) at 07:59

## 2020-02-21 RX ADMIN — Medication SCH ML: at 11:54

## 2020-02-21 RX ADMIN — Medication SCH CAP: at 07:57

## 2020-02-21 RX ADMIN — ONDANSETRON PRN MG: 2 INJECTION, SOLUTION INTRAMUSCULAR; INTRAVENOUS at 20:56

## 2020-02-21 RX ADMIN — OMEPRAZOLE SCH MG: 20 CAPSULE, DELAYED RELEASE ORAL at 07:58

## 2020-02-21 RX ADMIN — Medication SCH: at 01:07

## 2020-02-21 RX ADMIN — Medication PRN ML: at 17:02

## 2020-02-21 RX ADMIN — Medication SCH CAP: at 17:02

## 2020-02-21 RX ADMIN — NEOMYCIN AND POLYMYXIN B SULFATES AND BACITRACIN ZINC SCH EACH: 400; 3.5; 5 OINTMENT TOPICAL at 21:05

## 2020-02-22 VITALS — DIASTOLIC BLOOD PRESSURE: 89 MMHG | SYSTOLIC BLOOD PRESSURE: 156 MMHG | HEART RATE: 93 BPM

## 2020-02-22 LAB
CHLORIDE SERPL-SCNC: 105 MMOL/L (ref 98–107)
SODIUM SERPL-SCNC: 143 MMOL/L (ref 136–145)

## 2020-02-22 RX ADMIN — OMEPRAZOLE SCH MG: 20 CAPSULE, DELAYED RELEASE ORAL at 08:27

## 2020-02-22 RX ADMIN — Medication PRN ML: at 08:37

## 2020-02-22 RX ADMIN — Medication SCH CAP: at 08:26

## 2020-02-22 RX ADMIN — BENZOCAINE AND MENTHOL SCH MG: 15; 3.6 LOZENGE ORAL at 08:36

## 2020-02-22 RX ADMIN — BUDESONIDE SCH MG: 0.5 SUSPENSION RESPIRATORY (INHALATION) at 08:28

## 2020-02-22 RX ADMIN — Medication SCH ML: at 00:27

## 2020-03-04 ENCOUNTER — HOSPITAL ENCOUNTER (EMERGENCY)
Dept: HOSPITAL 52 - LL.ED | Age: 55
Discharge: HOME | End: 2020-03-04
Payer: MEDICARE

## 2020-03-04 VITALS — DIASTOLIC BLOOD PRESSURE: 84 MMHG | SYSTOLIC BLOOD PRESSURE: 154 MMHG | HEART RATE: 76 BPM

## 2020-03-04 DIAGNOSIS — M06.9: ICD-10-CM

## 2020-03-04 DIAGNOSIS — K21.9: ICD-10-CM

## 2020-03-04 DIAGNOSIS — F10.129: Primary | ICD-10-CM

## 2020-03-04 DIAGNOSIS — Z79.82: ICD-10-CM

## 2020-03-04 DIAGNOSIS — F41.9: ICD-10-CM

## 2020-03-04 DIAGNOSIS — Z86.718: ICD-10-CM

## 2020-03-04 DIAGNOSIS — F32.9: ICD-10-CM

## 2020-03-04 DIAGNOSIS — E11.40: ICD-10-CM

## 2020-03-04 DIAGNOSIS — J44.9: ICD-10-CM

## 2020-03-04 DIAGNOSIS — E03.9: ICD-10-CM

## 2020-03-04 DIAGNOSIS — I10: ICD-10-CM

## 2020-03-04 DIAGNOSIS — Z79.899: ICD-10-CM

## 2020-03-04 DIAGNOSIS — M19.90: ICD-10-CM

## 2020-03-04 DIAGNOSIS — E78.00: ICD-10-CM

## 2020-03-04 DIAGNOSIS — I48.91: ICD-10-CM

## 2020-03-04 LAB
CHLORIDE SERPL-SCNC: 106 MMOL/L (ref 98–107)
SODIUM SERPL-SCNC: 142 MMOL/L (ref 136–145)

## 2020-03-29 ENCOUNTER — HOSPITAL ENCOUNTER (EMERGENCY)
Dept: HOSPITAL 52 - LL.ED | Age: 55
Discharge: HOME | End: 2020-03-29
Payer: MEDICARE

## 2020-03-29 VITALS — SYSTOLIC BLOOD PRESSURE: 147 MMHG | HEART RATE: 103 BPM | DIASTOLIC BLOOD PRESSURE: 100 MMHG

## 2020-03-29 DIAGNOSIS — E03.9: ICD-10-CM

## 2020-03-29 DIAGNOSIS — Z96.652: ICD-10-CM

## 2020-03-29 DIAGNOSIS — I10: ICD-10-CM

## 2020-03-29 DIAGNOSIS — K21.9: ICD-10-CM

## 2020-03-29 DIAGNOSIS — E78.00: ICD-10-CM

## 2020-03-29 DIAGNOSIS — M19.90: ICD-10-CM

## 2020-03-29 DIAGNOSIS — F17.210: ICD-10-CM

## 2020-03-29 DIAGNOSIS — G62.9: ICD-10-CM

## 2020-03-29 DIAGNOSIS — I48.91: ICD-10-CM

## 2020-03-29 DIAGNOSIS — Z79.899: ICD-10-CM

## 2020-03-29 DIAGNOSIS — A49.02: ICD-10-CM

## 2020-03-29 DIAGNOSIS — I25.2: ICD-10-CM

## 2020-03-29 DIAGNOSIS — Z79.82: ICD-10-CM

## 2020-03-29 DIAGNOSIS — F10.230: Primary | ICD-10-CM

## 2020-03-29 LAB
CHLORIDE SERPL-SCNC: 105 MMOL/L (ref 98–107)
SODIUM SERPL-SCNC: 141 MMOL/L (ref 136–145)

## 2020-03-29 PROCEDURE — 85025 COMPLETE CBC W/AUTO DIFF WBC: CPT

## 2020-03-29 PROCEDURE — 83605 ASSAY OF LACTIC ACID: CPT

## 2020-03-29 PROCEDURE — 80053 COMPREHEN METABOLIC PANEL: CPT

## 2020-03-29 PROCEDURE — 99284 EMERGENCY DEPT VISIT MOD MDM: CPT

## 2020-03-29 PROCEDURE — 96375 TX/PRO/DX INJ NEW DRUG ADDON: CPT

## 2020-03-29 PROCEDURE — 96365 THER/PROPH/DIAG IV INF INIT: CPT

## 2020-03-29 PROCEDURE — 80307 DRUG TEST PRSMV CHEM ANLYZR: CPT

## 2020-03-29 PROCEDURE — 87040 BLOOD CULTURE FOR BACTERIA: CPT

## 2020-03-29 PROCEDURE — 36415 COLL VENOUS BLD VENIPUNCTURE: CPT

## 2020-06-13 ENCOUNTER — HOSPITAL ENCOUNTER (EMERGENCY)
Dept: HOSPITAL 52 - LL.ED | Age: 55
Discharge: LEFT BEFORE DISCHARGE | End: 2020-06-13
Payer: MEDICARE

## 2020-06-13 VITALS — DIASTOLIC BLOOD PRESSURE: 91 MMHG | SYSTOLIC BLOOD PRESSURE: 160 MMHG

## 2020-06-13 VITALS — HEART RATE: 83 BPM

## 2020-06-13 DIAGNOSIS — M19.90: ICD-10-CM

## 2020-06-13 DIAGNOSIS — F10.230: Primary | ICD-10-CM

## 2020-06-13 DIAGNOSIS — Z79.899: ICD-10-CM

## 2020-06-13 DIAGNOSIS — R45.851: ICD-10-CM

## 2020-06-13 DIAGNOSIS — I27.20: ICD-10-CM

## 2020-06-13 DIAGNOSIS — J44.9: ICD-10-CM

## 2020-06-13 DIAGNOSIS — Z79.82: ICD-10-CM

## 2020-06-13 DIAGNOSIS — I48.91: ICD-10-CM

## 2020-06-13 LAB
APAP SERPL-SCNC: 0 UG/ML (ref 10–30)
BARBITURATES UR QL SCN: NEGATIVE
BENZODIAZ UR QL SCN: NEGATIVE
CHLORIDE SERPL-SCNC: 103 MMOL/L (ref 98–107)
EDDP UR QL: NEGATIVE
SODIUM SERPL-SCNC: 143 MMOL/L (ref 136–145)
TCA UR-MCNC: NEGATIVE UG/ML
THC UR QL SCN>50 NG/ML: POSITIVE

## 2020-06-13 PROCEDURE — 36415 COLL VENOUS BLD VENIPUNCTURE: CPT

## 2020-06-13 PROCEDURE — 99285 EMERGENCY DEPT VISIT HI MDM: CPT

## 2020-06-13 PROCEDURE — 96372 THER/PROPH/DIAG INJ SC/IM: CPT

## 2020-06-13 PROCEDURE — 93005 ELECTROCARDIOGRAM TRACING: CPT

## 2020-06-13 PROCEDURE — 80305 DRUG TEST PRSMV DIR OPT OBS: CPT

## 2020-06-13 PROCEDURE — 85025 COMPLETE CBC W/AUTO DIFF WBC: CPT

## 2020-06-13 PROCEDURE — 80053 COMPREHEN METABOLIC PANEL: CPT

## 2020-06-13 PROCEDURE — 80307 DRUG TEST PRSMV CHEM ANLYZR: CPT

## 2020-06-13 RX ADMIN — SODIUM CHLORIDE ONE MG: 9 INJECTION, SOLUTION INTRAVENOUS at 19:22

## 2020-07-07 ENCOUNTER — HOSPITAL ENCOUNTER (EMERGENCY)
Dept: HOSPITAL 52 - LL.ED | Age: 55
Discharge: NURSING, REHAB OR LONG-TERM CARE | End: 2020-07-07
Payer: MEDICARE

## 2020-07-07 VITALS — HEART RATE: 81 BPM | SYSTOLIC BLOOD PRESSURE: 114 MMHG | DIASTOLIC BLOOD PRESSURE: 71 MMHG

## 2020-07-07 DIAGNOSIS — J44.9: ICD-10-CM

## 2020-07-07 DIAGNOSIS — I48.91: ICD-10-CM

## 2020-07-07 DIAGNOSIS — F41.9: ICD-10-CM

## 2020-07-07 DIAGNOSIS — F10.129: ICD-10-CM

## 2020-07-07 DIAGNOSIS — R41.82: Primary | ICD-10-CM

## 2020-07-07 DIAGNOSIS — E03.9: ICD-10-CM

## 2020-07-07 DIAGNOSIS — I27.20: ICD-10-CM

## 2020-07-07 DIAGNOSIS — F19.10: ICD-10-CM

## 2020-07-07 DIAGNOSIS — Z79.899: ICD-10-CM

## 2020-07-07 DIAGNOSIS — E78.00: ICD-10-CM

## 2020-07-07 DIAGNOSIS — Z79.82: ICD-10-CM

## 2020-07-07 DIAGNOSIS — F32.9: ICD-10-CM

## 2020-07-07 DIAGNOSIS — M19.90: ICD-10-CM

## 2020-07-07 LAB
BARBITURATES UR QL SCN: NEGATIVE
BENZODIAZ UR QL SCN: NEGATIVE
CHLORIDE SERPL-SCNC: 107 MMOL/L (ref 98–107)
EDDP UR QL: NEGATIVE
SODIUM SERPL-SCNC: 142 MMOL/L (ref 136–145)
TCA UR-MCNC: NEGATIVE UG/ML
THC UR QL SCN>50 NG/ML: POSITIVE

## 2020-07-07 PROCEDURE — 70450 CT HEAD/BRAIN W/O DYE: CPT

## 2020-07-07 PROCEDURE — 83605 ASSAY OF LACTIC ACID: CPT

## 2020-07-07 PROCEDURE — 96374 THER/PROPH/DIAG INJ IV PUSH: CPT

## 2020-07-07 PROCEDURE — 99285 EMERGENCY DEPT VISIT HI MDM: CPT

## 2020-07-07 PROCEDURE — 83690 ASSAY OF LIPASE: CPT

## 2020-07-07 PROCEDURE — 81001 URINALYSIS AUTO W/SCOPE: CPT

## 2020-07-07 PROCEDURE — 80307 DRUG TEST PRSMV CHEM ANLYZR: CPT

## 2020-07-07 PROCEDURE — 36415 COLL VENOUS BLD VENIPUNCTURE: CPT

## 2020-07-07 PROCEDURE — 99284 EMERGENCY DEPT VISIT MOD MDM: CPT

## 2020-07-07 PROCEDURE — 82150 ASSAY OF AMYLASE: CPT

## 2020-07-07 PROCEDURE — 80053 COMPREHEN METABOLIC PANEL: CPT

## 2020-07-07 PROCEDURE — 85025 COMPLETE CBC W/AUTO DIFF WBC: CPT

## 2020-07-07 PROCEDURE — 80305 DRUG TEST PRSMV DIR OPT OBS: CPT

## 2020-07-07 PROCEDURE — 96375 TX/PRO/DX INJ NEW DRUG ADDON: CPT

## 2020-07-07 PROCEDURE — 96361 HYDRATE IV INFUSION ADD-ON: CPT

## 2020-07-07 RX ADMIN — NALOXONE HYDROCHLORIDE ONE MG: 0.4 INJECTION, SOLUTION INTRAMUSCULAR; INTRAVENOUS; SUBCUTANEOUS at 19:05

## 2020-07-07 RX ADMIN — FLUMAZENIL ONE MG: 0.1 INJECTION, SOLUTION INTRAVENOUS at 19:06

## 2020-09-21 ENCOUNTER — HOSPITAL ENCOUNTER (OUTPATIENT)
Dept: HOSPITAL 52 - LL.ED | Age: 55
Setting detail: OBSERVATION
LOS: 2 days | Discharge: HOME | End: 2020-09-23
Attending: FAMILY MEDICINE | Admitting: EMERGENCY MEDICINE
Payer: MEDICARE

## 2020-09-21 DIAGNOSIS — S42.032A: Primary | ICD-10-CM

## 2020-09-21 DIAGNOSIS — F41.9: ICD-10-CM
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
## 2020-09-21 DIAGNOSIS — F31.30: ICD-10-CM

## 2020-09-21 DIAGNOSIS — E78.2: ICD-10-CM

## 2020-09-21 DIAGNOSIS — Z91.19: ICD-10-CM

## 2020-09-21 DIAGNOSIS — T50.901A: ICD-10-CM

## 2020-09-21 DIAGNOSIS — M17.0: ICD-10-CM

## 2020-09-21 DIAGNOSIS — K21.9: ICD-10-CM
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
## 2020-09-21 DIAGNOSIS — J43.1: ICD-10-CM

## 2020-09-21 DIAGNOSIS — F10.221: ICD-10-CM

## 2020-09-21 DIAGNOSIS — Z51.5: ICD-10-CM

## 2020-09-21 DIAGNOSIS — E03.9: ICD-10-CM

## 2020-09-21 DIAGNOSIS — F32.9: ICD-10-CM

## 2020-09-21 DIAGNOSIS — E87.6: ICD-10-CM

## 2020-09-21 DIAGNOSIS — F41.8: ICD-10-CM

## 2020-09-21 DIAGNOSIS — Z79.899: ICD-10-CM

## 2020-09-21 DIAGNOSIS — I10: ICD-10-CM

## 2020-09-21 DIAGNOSIS — E78.00: ICD-10-CM

## 2020-09-21 DIAGNOSIS — F17.200: ICD-10-CM

## 2020-09-21 LAB
APAP SERPL-SCNC: < 10 UG/ML (ref 10–30)
BARBITURATES UR QL SCN: NEGATIVE
BENZODIAZ UR QL SCN: POSITIVE
CHLORIDE SERPL-SCNC: 102 MMOL/L (ref 98–107)
EDDP UR QL: NEGATIVE
SODIUM SERPL-SCNC: 137 MMOL/L (ref 136–145)
TCA UR-MCNC: NEGATIVE UG/ML
THC UR QL SCN>50 NG/ML: POSITIVE

## 2020-09-21 PROCEDURE — 80053 COMPREHEN METABOLIC PANEL: CPT

## 2020-09-21 PROCEDURE — 80307 DRUG TEST PRSMV CHEM ANLYZR: CPT

## 2020-09-21 PROCEDURE — 94640 AIRWAY INHALATION TREATMENT: CPT

## 2020-09-21 PROCEDURE — 97535 SELF CARE MNGMENT TRAINING: CPT

## 2020-09-21 PROCEDURE — 99285 EMERGENCY DEPT VISIT HI MDM: CPT

## 2020-09-21 PROCEDURE — 73020 X-RAY EXAM OF SHOULDER: CPT

## 2020-09-21 PROCEDURE — 72125 CT NECK SPINE W/O DYE: CPT

## 2020-09-21 PROCEDURE — 70450 CT HEAD/BRAIN W/O DYE: CPT

## 2020-09-21 PROCEDURE — 97161 PT EVAL LOW COMPLEX 20 MIN: CPT

## 2020-09-21 PROCEDURE — 81001 URINALYSIS AUTO W/SCOPE: CPT

## 2020-09-21 PROCEDURE — 97165 OT EVAL LOW COMPLEX 30 MIN: CPT

## 2020-09-21 PROCEDURE — 96374 THER/PROPH/DIAG INJ IV PUSH: CPT

## 2020-09-21 PROCEDURE — 80048 BASIC METABOLIC PNL TOTAL CA: CPT

## 2020-09-21 PROCEDURE — 36415 COLL VENOUS BLD VENIPUNCTURE: CPT

## 2020-09-21 PROCEDURE — 97110 THERAPEUTIC EXERCISES: CPT

## 2020-09-21 PROCEDURE — 85025 COMPLETE CBC W/AUTO DIFF WBC: CPT

## 2020-09-21 PROCEDURE — G0378 HOSPITAL OBSERVATION PER HR: HCPCS

## 2020-09-21 PROCEDURE — 96375 TX/PRO/DX INJ NEW DRUG ADDON: CPT

## 2020-09-21 PROCEDURE — 51702 INSERT TEMP BLADDER CATH: CPT

## 2020-09-21 PROCEDURE — 83735 ASSAY OF MAGNESIUM: CPT

## 2020-09-21 PROCEDURE — 80305 DRUG TEST PRSMV DIR OPT OBS: CPT

## 2020-09-21 PROCEDURE — 96372 THER/PROPH/DIAG INJ SC/IM: CPT

## 2020-09-21 PROCEDURE — 97530 THERAPEUTIC ACTIVITIES: CPT

## 2020-09-21 PROCEDURE — 96361 HYDRATE IV INFUSION ADD-ON: CPT

## 2020-09-21 RX ADMIN — SODIUM CHLORIDE AND POTASSIUM CHLORIDE SCH MLS/HR: .9; .15 SOLUTION INTRAVENOUS at 23:46

## 2020-09-22 LAB
CHLORIDE SERPL-SCNC: 109 MMOL/L (ref 98–107)
SODIUM SERPL-SCNC: 138 MMOL/L (ref 136–145)

## 2020-09-22 RX ADMIN — SODIUM CHLORIDE AND POTASSIUM CHLORIDE SCH MLS/HR: .9; .15 SOLUTION INTRAVENOUS at 12:16

## 2020-09-22 RX ADMIN — OMEPRAZOLE SCH MG: 20 CAPSULE, DELAYED RELEASE ORAL at 10:27

## 2020-09-22 RX ADMIN — SODIUM CHLORIDE AND POTASSIUM CHLORIDE SCH MLS/HR: .9; .15 SOLUTION INTRAVENOUS at 18:56

## 2020-09-22 RX ADMIN — TIOTROPIUM BROMIDE SCH MCG: 18 CAPSULE ORAL; RESPIRATORY (INHALATION) at 10:27

## 2020-09-22 RX ADMIN — BENZOCAINE AND MENTHOL SCH MG: 15; 3.6 LOZENGE ORAL at 11:17

## 2020-09-22 RX ADMIN — SODIUM CHLORIDE AND POTASSIUM CHLORIDE SCH MLS/HR: .9; .15 SOLUTION INTRAVENOUS at 05:33

## 2020-09-23 VITALS — HEART RATE: 62 BPM | DIASTOLIC BLOOD PRESSURE: 79 MMHG | SYSTOLIC BLOOD PRESSURE: 139 MMHG

## 2020-09-23 LAB
CHLORIDE SERPL-SCNC: 109 MMOL/L (ref 98–107)
SODIUM SERPL-SCNC: 139 MMOL/L (ref 136–145)

## 2020-09-23 RX ADMIN — TIOTROPIUM BROMIDE SCH CAP: 18 CAPSULE ORAL; RESPIRATORY (INHALATION) at 07:16

## 2020-09-23 RX ADMIN — BENZOCAINE AND MENTHOL SCH MG: 15; 3.6 LOZENGE ORAL at 07:14

## 2020-09-23 RX ADMIN — OMEPRAZOLE SCH MG: 20 CAPSULE, DELAYED RELEASE ORAL at 07:15

## 2020-09-23 RX ADMIN — SODIUM CHLORIDE AND POTASSIUM CHLORIDE SCH MLS/HR: .9; .15 SOLUTION INTRAVENOUS at 01:05

## 2020-09-28 ENCOUNTER — HOSPITAL ENCOUNTER (EMERGENCY)
Dept: HOSPITAL 52 - LL.ED | Age: 55
Discharge: HOME | End: 2020-09-28
Payer: MEDICARE

## 2020-09-28 VITALS — SYSTOLIC BLOOD PRESSURE: 149 MMHG | HEART RATE: 97 BPM | DIASTOLIC BLOOD PRESSURE: 89 MMHG

## 2020-09-28 DIAGNOSIS — F32.9: ICD-10-CM

## 2020-09-28 DIAGNOSIS — M19.90: ICD-10-CM

## 2020-09-28 DIAGNOSIS — F41.9: ICD-10-CM

## 2020-09-28 DIAGNOSIS — Z98.890: ICD-10-CM

## 2020-09-28 DIAGNOSIS — J44.9: ICD-10-CM

## 2020-09-28 DIAGNOSIS — F10.120: Primary | ICD-10-CM

## 2020-09-28 DIAGNOSIS — I48.0: ICD-10-CM

## 2020-09-28 DIAGNOSIS — Z79.899: ICD-10-CM

## 2020-09-28 LAB
CHLORIDE SERPL-SCNC: 105 MMOL/L (ref 98–107)
SODIUM SERPL-SCNC: 144 MMOL/L (ref 136–145)

## 2020-12-23 ENCOUNTER — HOSPITAL ENCOUNTER (EMERGENCY)
Dept: HOSPITAL 52 - LL.ED | Age: 55
Discharge: PSYCHIATRIC OR SUBSTANCE USE FACILITY | End: 2020-12-23
Payer: MEDICARE

## 2020-12-23 VITALS — SYSTOLIC BLOOD PRESSURE: 157 MMHG | DIASTOLIC BLOOD PRESSURE: 92 MMHG | HEART RATE: 78 BPM

## 2020-12-23 DIAGNOSIS — Z71.6: ICD-10-CM

## 2020-12-23 DIAGNOSIS — F41.8: ICD-10-CM

## 2020-12-23 DIAGNOSIS — E87.6: ICD-10-CM

## 2020-12-23 DIAGNOSIS — K21.9: ICD-10-CM

## 2020-12-23 DIAGNOSIS — E03.9: ICD-10-CM

## 2020-12-23 DIAGNOSIS — R74.02: ICD-10-CM

## 2020-12-23 DIAGNOSIS — E83.42: ICD-10-CM

## 2020-12-23 DIAGNOSIS — S11.21XA: Primary | ICD-10-CM
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
## 2020-12-23 DIAGNOSIS — F17.210: ICD-10-CM

## 2020-12-23 DIAGNOSIS — S00.83XA: ICD-10-CM

## 2020-12-23 DIAGNOSIS — F19.90: ICD-10-CM

## 2020-12-23 DIAGNOSIS — G62.9: ICD-10-CM

## 2020-12-23 DIAGNOSIS — Z20.828: ICD-10-CM

## 2020-12-23 DIAGNOSIS — E79.0: ICD-10-CM

## 2020-12-23 DIAGNOSIS — I48.91: ICD-10-CM

## 2020-12-23 DIAGNOSIS — J43.1: ICD-10-CM

## 2020-12-23 DIAGNOSIS — M05.79: ICD-10-CM

## 2020-12-23 DIAGNOSIS — Z79.899: ICD-10-CM

## 2020-12-23 DIAGNOSIS — E78.00: ICD-10-CM

## 2020-12-23 DIAGNOSIS — I10: ICD-10-CM

## 2020-12-23 DIAGNOSIS — F10.10: ICD-10-CM

## 2020-12-23 LAB
APTT PPP: 24.3 SEC (ref 24.5–32.8)
BARBITURATES UR QL SCN: NEGATIVE
BENZODIAZ UR QL SCN: NEGATIVE
CHLORIDE SERPL-SCNC: 101 MMOL/L (ref 98–107)
CHLORIDE SERPL-SCNC: 104 MMOL/L (ref 98–107)
CHLORIDE SERPL-SCNC: 104 MMOL/L (ref 98–107)
EDDP UR QL: NEGATIVE
SODIUM SERPL-SCNC: 136 MMOL/L (ref 136–145)
SODIUM SERPL-SCNC: 138 MMOL/L (ref 136–145)
SODIUM SERPL-SCNC: 138 MMOL/L (ref 136–145)
TCA UR-MCNC: NEGATIVE UG/ML
THC UR QL SCN>50 NG/ML: POSITIVE

## 2020-12-23 PROCEDURE — C9113 INJ PANTOPRAZOLE SODIUM, VIA: HCPCS

## 2020-12-23 PROCEDURE — U0002 COVID-19 LAB TEST NON-CDC: HCPCS

## 2020-12-23 RX ADMIN — Medication PRN ML: at 16:42

## 2020-12-23 RX ADMIN — Medication PRN ML: at 13:48

## 2020-12-23 RX ADMIN — Medication PRN ML: at 10:40

## 2022-01-10 ENCOUNTER — HOSPITAL ENCOUNTER (INPATIENT)
Dept: HOSPITAL 52 - LL.ED | Age: 57
LOS: 1 days | Discharge: LEFT BEFORE DISCHARGE | DRG: 871 | End: 2022-01-11
Payer: MEDICARE

## 2022-01-10 DIAGNOSIS — Z86.19: ICD-10-CM

## 2022-01-10 DIAGNOSIS — N40.0: ICD-10-CM

## 2022-01-10 DIAGNOSIS — R65.21: ICD-10-CM

## 2022-01-10 DIAGNOSIS — J18.9: ICD-10-CM

## 2022-01-10 DIAGNOSIS — E78.5: ICD-10-CM

## 2022-01-10 DIAGNOSIS — Z20.822: ICD-10-CM

## 2022-01-10 DIAGNOSIS — E87.5: ICD-10-CM

## 2022-01-10 DIAGNOSIS — M19.90: ICD-10-CM

## 2022-01-10 DIAGNOSIS — F12.90: ICD-10-CM

## 2022-01-10 DIAGNOSIS — E78.00: ICD-10-CM

## 2022-01-10 DIAGNOSIS — A41.9: Primary | ICD-10-CM

## 2022-01-10 DIAGNOSIS — N17.0: ICD-10-CM

## 2022-01-10 DIAGNOSIS — I10: ICD-10-CM

## 2022-01-10 DIAGNOSIS — F32.A: ICD-10-CM

## 2022-01-10 DIAGNOSIS — E87.6: ICD-10-CM

## 2022-01-10 DIAGNOSIS — K58.9: ICD-10-CM

## 2022-01-10 DIAGNOSIS — G89.29: ICD-10-CM

## 2022-01-10 DIAGNOSIS — M06.9: ICD-10-CM

## 2022-01-10 DIAGNOSIS — E03.9: ICD-10-CM

## 2022-01-10 DIAGNOSIS — K21.9: ICD-10-CM

## 2022-01-10 DIAGNOSIS — D64.9: ICD-10-CM

## 2022-01-10 DIAGNOSIS — K52.9: ICD-10-CM

## 2022-01-10 DIAGNOSIS — D84.9: ICD-10-CM

## 2022-01-10 DIAGNOSIS — E83.42: ICD-10-CM

## 2022-01-10 DIAGNOSIS — G62.9: ICD-10-CM

## 2022-01-10 DIAGNOSIS — E86.0: ICD-10-CM

## 2022-01-10 DIAGNOSIS — K59.09: ICD-10-CM

## 2022-01-10 DIAGNOSIS — Z79.51: ICD-10-CM

## 2022-01-10 DIAGNOSIS — Z79.899: ICD-10-CM

## 2022-01-10 DIAGNOSIS — Z98.52: ICD-10-CM

## 2022-01-10 DIAGNOSIS — Z79.890: ICD-10-CM

## 2022-01-10 DIAGNOSIS — K76.0: ICD-10-CM

## 2022-01-10 DIAGNOSIS — I27.20: ICD-10-CM

## 2022-01-10 DIAGNOSIS — M54.2: ICD-10-CM

## 2022-01-10 DIAGNOSIS — G47.00: ICD-10-CM

## 2022-01-10 DIAGNOSIS — Z90.49: ICD-10-CM

## 2022-01-10 DIAGNOSIS — F41.9: ICD-10-CM

## 2022-01-10 DIAGNOSIS — M54.9: ICD-10-CM

## 2022-01-10 LAB
ANION GAP SERPL CALC-SCNC: 16.9 MEQ/L (ref 7–15)
APAP SERPL-SCNC: < 0 UG/ML (ref 10–30)
BARBITURATES UR QL SCN: NEGATIVE
BENZODIAZ UR QL SCN: NEGATIVE
BUPRENORPHINE UR QL: NEGATIVE
CHLORIDE SERPL-SCNC: 104 MMOL/L (ref 98–107)
EDDP UR QL: NEGATIVE
RSV RNA UPPER RESP QL NAA+PROBE: NEGATIVE
SARS-COV-2 RNA RESP QL NAA+PROBE: NEGATIVE
SODIUM SERPL-SCNC: 136 MMOL/L (ref 136–145)
TCA UR-MCNC: NEGATIVE UG/ML
THC UR QL SCN>50 NG/ML: POSITIVE

## 2022-01-10 RX ADMIN — SODIUM POLYSTYRENE SULFONATE SCH GM: 15 SUSPENSION ORAL; RECTAL at 23:57

## 2022-01-11 VITALS — DIASTOLIC BLOOD PRESSURE: 87 MMHG | HEART RATE: 82 BPM | SYSTOLIC BLOOD PRESSURE: 145 MMHG

## 2022-01-11 LAB
ANION GAP SERPL CALC-SCNC: 15.2 MEQ/L (ref 7–15)
CHLORIDE SERPL-SCNC: 112 MMOL/L (ref 98–107)
SODIUM SERPL-SCNC: 145 MMOL/L (ref 136–145)

## 2022-01-11 RX ADMIN — SODIUM POLYSTYRENE SULFONATE SCH: 15 SUSPENSION ORAL; RECTAL at 08:06

## 2022-03-21 ENCOUNTER — HOSPITAL ENCOUNTER (EMERGENCY)
Dept: HOSPITAL 52 - LL.ED | Age: 57
Discharge: LEFT BEFORE DISCHARGE | End: 2022-03-21
Payer: MEDICARE

## 2022-03-21 VITALS — HEART RATE: 98 BPM | SYSTOLIC BLOOD PRESSURE: 161 MMHG | DIASTOLIC BLOOD PRESSURE: 85 MMHG

## 2022-03-21 DIAGNOSIS — Z20.822: ICD-10-CM

## 2022-03-21 DIAGNOSIS — Z79.899: ICD-10-CM

## 2022-03-21 DIAGNOSIS — I10: ICD-10-CM

## 2022-03-21 DIAGNOSIS — N40.0: ICD-10-CM

## 2022-03-21 DIAGNOSIS — F10.10: Primary | ICD-10-CM

## 2022-03-21 DIAGNOSIS — I48.91: ICD-10-CM

## 2022-03-21 DIAGNOSIS — E78.00: ICD-10-CM

## 2022-03-21 LAB
ANION GAP SERPL CALC-SCNC: 17.1 MEQ/L (ref 7–15)
BARBITURATES UR QL SCN: NEGATIVE
BENZODIAZ UR QL SCN: NEGATIVE
BUPRENORPHINE UR QL: NEGATIVE
CHLORIDE SERPL-SCNC: 103 MMOL/L (ref 98–107)
EDDP UR QL: NEGATIVE
RSV RNA UPPER RESP QL NAA+PROBE: NEGATIVE
SARS-COV-2 RNA RESP QL NAA+PROBE: NEGATIVE
SODIUM SERPL-SCNC: 140 MMOL/L (ref 136–145)
TCA UR-MCNC: NEGATIVE UG/ML
THC UR QL SCN>50 NG/ML: POSITIVE

## 2022-03-21 PROCEDURE — 84484 ASSAY OF TROPONIN QUANT: CPT

## 2022-03-21 PROCEDURE — 85025 COMPLETE CBC W/AUTO DIFF WBC: CPT

## 2022-03-21 PROCEDURE — 86140 C-REACTIVE PROTEIN: CPT

## 2022-03-21 PROCEDURE — 36415 COLL VENOUS BLD VENIPUNCTURE: CPT

## 2022-03-21 PROCEDURE — 80307 DRUG TEST PRSMV CHEM ANLYZR: CPT

## 2022-03-21 PROCEDURE — 80053 COMPREHEN METABOLIC PANEL: CPT

## 2022-03-21 PROCEDURE — 80305 DRUG TEST PRSMV DIR OPT OBS: CPT

## 2022-03-21 PROCEDURE — 0241U: CPT

## 2022-03-21 PROCEDURE — 93005 ELECTROCARDIOGRAM TRACING: CPT

## 2022-03-21 PROCEDURE — 83880 ASSAY OF NATRIURETIC PEPTIDE: CPT

## 2022-03-21 PROCEDURE — 99285 EMERGENCY DEPT VISIT HI MDM: CPT

## 2023-03-25 ENCOUNTER — HOSPITAL ENCOUNTER (EMERGENCY)
Dept: HOSPITAL 52 - LL.ED | Age: 58
Discharge: LEFT BEFORE DISCHARGE | End: 2023-03-25
Payer: MEDICARE

## 2023-03-25 VITALS — SYSTOLIC BLOOD PRESSURE: 127 MMHG | HEART RATE: 130 BPM | DIASTOLIC BLOOD PRESSURE: 82 MMHG

## 2023-03-25 DIAGNOSIS — I48.91: ICD-10-CM

## 2023-03-25 DIAGNOSIS — R47.81: ICD-10-CM

## 2023-03-25 DIAGNOSIS — E78.00: ICD-10-CM

## 2023-03-25 DIAGNOSIS — Z91.14: Primary | ICD-10-CM

## 2023-03-25 DIAGNOSIS — Z79.899: ICD-10-CM

## 2023-03-25 DIAGNOSIS — K21.9: ICD-10-CM

## 2023-03-25 DIAGNOSIS — Z88.8: ICD-10-CM

## 2023-03-25 DIAGNOSIS — I10: ICD-10-CM

## 2023-03-25 DIAGNOSIS — E66.01: ICD-10-CM

## 2023-03-25 DIAGNOSIS — D64.9: ICD-10-CM

## 2023-03-25 DIAGNOSIS — E03.9: ICD-10-CM

## 2023-03-25 DIAGNOSIS — M19.90: ICD-10-CM

## 2023-03-25 DIAGNOSIS — J44.9: ICD-10-CM

## 2023-03-25 LAB
ANION GAP SERPL CALC-SCNC: 18.7 MEQ/L (ref 7–15)
BARBITURATES UR QL SCN: NEGATIVE
BENZODIAZ UR QL SCN: POSITIVE
BUPRENORPHINE UR QL: NEGATIVE
CHLORIDE SERPL-SCNC: 105 MMOL/L (ref 98–107)
EDDP UR QL: NEGATIVE
EGFRCR SERPLBLD CKD-EPI 2021: 90 ML/MIN (ref 60–?)
SODIUM SERPL-SCNC: 139 MMOL/L (ref 136–145)
TCA UR-MCNC: POSITIVE UG/ML
THC UR QL SCN>50 NG/ML: POSITIVE

## 2023-03-25 RX ADMIN — Medication PRN ML: at 19:11

## 2023-03-25 RX ADMIN — Medication PRN ML: at 18:34

## 2023-04-29 ENCOUNTER — HOSPITAL ENCOUNTER (INPATIENT)
Dept: HOSPITAL 52 - LL.ED | Age: 58
LOS: 3 days | Discharge: NURSING, REHAB OR LONG-TERM CARE | DRG: 871 | End: 2023-05-02
Attending: EMERGENCY MEDICINE | Admitting: PHYSICIAN ASSISTANT
Payer: MEDICARE

## 2023-04-29 DIAGNOSIS — E86.0: ICD-10-CM

## 2023-04-29 DIAGNOSIS — I26.99: ICD-10-CM

## 2023-04-29 DIAGNOSIS — M06.9: ICD-10-CM

## 2023-04-29 DIAGNOSIS — Z51.5: ICD-10-CM

## 2023-04-29 DIAGNOSIS — Z91.148: ICD-10-CM

## 2023-04-29 DIAGNOSIS — N17.9: ICD-10-CM

## 2023-04-29 DIAGNOSIS — R19.7: ICD-10-CM

## 2023-04-29 DIAGNOSIS — I10: ICD-10-CM

## 2023-04-29 DIAGNOSIS — F10.10: ICD-10-CM

## 2023-04-29 DIAGNOSIS — F31.9: ICD-10-CM

## 2023-04-29 DIAGNOSIS — J43.1: ICD-10-CM

## 2023-04-29 DIAGNOSIS — I27.20: ICD-10-CM

## 2023-04-29 DIAGNOSIS — M19.90: ICD-10-CM

## 2023-04-29 DIAGNOSIS — Z66: ICD-10-CM

## 2023-04-29 DIAGNOSIS — G47.00: ICD-10-CM

## 2023-04-29 DIAGNOSIS — E78.2: ICD-10-CM

## 2023-04-29 DIAGNOSIS — G89.29: ICD-10-CM

## 2023-04-29 DIAGNOSIS — Z81.8: ICD-10-CM

## 2023-04-29 DIAGNOSIS — I48.0: ICD-10-CM

## 2023-04-29 DIAGNOSIS — D64.9: ICD-10-CM

## 2023-04-29 DIAGNOSIS — K21.9: ICD-10-CM

## 2023-04-29 DIAGNOSIS — J18.9: ICD-10-CM

## 2023-04-29 DIAGNOSIS — F10.97: ICD-10-CM

## 2023-04-29 DIAGNOSIS — Z88.8: ICD-10-CM

## 2023-04-29 DIAGNOSIS — Z79.51: ICD-10-CM

## 2023-04-29 DIAGNOSIS — F41.8: ICD-10-CM

## 2023-04-29 DIAGNOSIS — Z98.52: ICD-10-CM

## 2023-04-29 DIAGNOSIS — Z79.899: ICD-10-CM

## 2023-04-29 DIAGNOSIS — A41.9: Primary | ICD-10-CM

## 2023-04-29 DIAGNOSIS — M17.0: ICD-10-CM

## 2023-04-29 DIAGNOSIS — Z98.890: ICD-10-CM

## 2023-04-29 DIAGNOSIS — M54.9: ICD-10-CM

## 2023-04-29 DIAGNOSIS — E87.6: ICD-10-CM

## 2023-04-29 DIAGNOSIS — E83.39: ICD-10-CM

## 2023-04-29 DIAGNOSIS — Z90.49: ICD-10-CM

## 2023-04-29 DIAGNOSIS — E83.42: ICD-10-CM

## 2023-04-29 DIAGNOSIS — K59.09: ICD-10-CM

## 2023-04-29 DIAGNOSIS — R65.20: ICD-10-CM

## 2023-04-29 DIAGNOSIS — E03.9: ICD-10-CM

## 2023-04-29 LAB
ALBUMIN SERPL-MCNC: 3.2 G/DL (ref 3.4–5)
ALP SERPL-CCNC: 106 IU/L (ref 46–116)
ALT SERPL-CCNC: 28 U/L (ref 12–78)
AMPHET UR QL SCN: NEGATIVE
AMPHET UR QL SCN: NEGATIVE
ANION GAP SERPL CALC-SCNC: 14.4 MEQ/L (ref 7–15)
APPEARANCE UR: (no result)
AST SERPL-CCNC: 38 U/L (ref 15–37)
BACTERIA URNS QL MICRO: (no result) /HPF
BARBITURATES UR QL SCN: NEGATIVE
BASOPHILS # BLD AUTO: 0.01 K/UL (ref 0–0.2)
BASOPHILS NFR BLD AUTO: 0.1 % (ref 0–2)
BENZODIAZ UR QL SCN: POSITIVE
BILIRUB SERPL-MCNC: 0.3 MG/DL (ref 0.2–1)
BILIRUB UR STRIP-MCNC: (no result) MG/DL
BUPRENORPHINE UR QL: NEGATIVE
CHLORIDE SERPL-SCNC: 105 MMOL/L (ref 98–107)
CK SERPL-CCNC: 536 U/L (ref 26–308)
CO2 SERPL-SCNC: 25.9 MMOL/L (ref 21–32)
COCAINE UR QL SCN: NEGATIVE
COLOR UR: (no result)
CREAT CL 24H UR+SERPL-VRATE: 59.49 ML/MIN
CREAT SERPL-MCNC: 1.25 MG/DL (ref 0.51–1.17)
EDDP UR QL: NEGATIVE
EGFRCR SERPLBLD CKD-EPI 2021: 66 ML/MIN (ref 60–?)
EOSINOPHIL # BLD AUTO: 0.03 K/UL (ref 0–0.5)
EOSINOPHIL NFR BLD AUTO: 0.3 % (ref 0–5)
EPI CELLS #/AREA URNS HPF: (no result) /LPF
ERYTHROCYTE [DISTWIDTH] IN BLOOD BY AUTOMATED COUNT: 17.3 % (ref 11.2–14.1)
ETHANOL BLD-MCNC: 0 G/DL (ref 0–0.08)
FLUAV RNA UPPER RESP QL NAA+PROBE: NEGATIVE
FLUBV RNA UPPER RESP QL NAA+PROBE: NEGATIVE
GLUCOSE UR STRIP-MCNC: NEGATIVE MG/DL
HCT VFR BLD AUTO: 44 % (ref 39–49)
HGB BLD-MCNC: 13.8 G/DL (ref 13.1–16.8)
KETONES UR STRIP-MCNC: NEGATIVE MG/DL
LYMPHOCYTES # BLD AUTO: 0.4 K/UL (ref 0.5–3.5)
LYMPHOCYTES NFR BLD AUTO: 3.5 % (ref 10–50)
MCH RBC QN AUTO: 25.6 PG (ref 28.2–33.3)
MCHC RBC AUTO-ENTMCNC: 31.4 G/DL (ref 31.7–36)
MCHC RBC AUTO-ENTMCNC: 81.6 FL (ref 84–98)
MONOCYTES # BLD AUTO: 0.14 K/UL (ref 0–1)
MONOCYTES NFR BLD AUTO: 1.2 % (ref 2–14)
NEUTROPHILS # BLD AUTO: 10.94 K/UL (ref 1.4–7)
NEUTROPHILS NFR BLD AUTO: 94.9 % (ref 45–80)
NITRITE UR QL: NEGATIVE
OXYCODONE UR QL SCN: NEGATIVE
PH UR STRIP: 7 [PH] (ref 5–9)
PLATELET # BLD AUTO: 147 K/UL (ref 150–350)
POTASSIUM SERPL-SCNC: 4.3 MMOL/L (ref 3.5–5.1)
PROT SERPL-MCNC: 7.1 G/DL (ref 6.4–8.2)
PROT UR STRIP-MCNC: 100 MG/DL
RBC # BLD AUTO: 5.39 M/UL (ref 4.33–5.41)
RBC # URNS HPF: (no result) /HPF
RBC UR QL: NEGATIVE
RSV RNA UPPER RESP QL NAA+PROBE: NEGATIVE
SARS-COV-2 RNA RESP QL NAA+PROBE: NEGATIVE
SODIUM SERPL-SCNC: 141 MMOL/L (ref 136–145)
SP GR UR STRIP: 1.02 (ref 1–1.03)
TCA UR-MCNC: POSITIVE UG/ML
THC UR QL SCN>50 NG/ML: POSITIVE
UROBILINOGEN UR STRIP-ACNC: 1 E.U./DL (ref 0.2–1)
WBC # BLD AUTO: 11.5 K/UL (ref 4–10.2)
WBC UR QL: (no result) /HPF

## 2023-04-30 LAB
APPEARANCE UR: CLEAR
BACTERIA URNS QL MICRO: (no result) /HPF
BASOPHILS # BLD AUTO: 0.01 K/UL (ref 0–0.2)
BASOPHILS NFR BLD AUTO: 0.1 % (ref 0–2)
BILIRUB UR STRIP-MCNC: NEGATIVE MG/DL
COLOR UR: YELLOW
EOSINOPHIL # BLD AUTO: 0.05 K/UL (ref 0–0.5)
EOSINOPHIL NFR BLD AUTO: 0.4 % (ref 0–5)
EPI CELLS #/AREA URNS HPF: (no result) /LPF
ERYTHROCYTE [DISTWIDTH] IN BLOOD BY AUTOMATED COUNT: 16 % (ref 11.2–14.1)
GLUCOSE UR STRIP-MCNC: NEGATIVE MG/DL
HCT VFR BLD AUTO: 37.2 % (ref 39–49)
HGB BLD-MCNC: 11.7 G/DL (ref 13.1–16.8)
INR PPP: 1.1
KETONES UR STRIP-MCNC: NEGATIVE MG/DL
LACTATE SERPL-SCNC: 1.3 MMOL/L (ref 0.4–2)
LYMPHOCYTES # BLD AUTO: 0.8 K/UL (ref 0.5–3.5)
LYMPHOCYTES NFR BLD AUTO: 5.9 % (ref 10–50)
MCH RBC QN AUTO: 25.5 PG (ref 28.2–33.3)
MCHC RBC AUTO-ENTMCNC: 31.5 G/DL (ref 31.7–36)
MCHC RBC AUTO-ENTMCNC: 81 FL (ref 84–98)
MONOCYTES # BLD AUTO: 0.36 K/UL (ref 0–1)
MONOCYTES NFR BLD AUTO: 2.6 % (ref 2–14)
MUCOUS THREADS URNS QL MICRO: (no result) /LPF
NEUTROPHILS # BLD AUTO: 12.42 K/UL (ref 1.4–7)
NEUTROPHILS NFR BLD AUTO: 91 % (ref 45–80)
NITRITE UR QL: NEGATIVE
PH UR STRIP: 8.5 [PH] (ref 5–9)
PLATELET # BLD AUTO: 155 K/UL (ref 150–350)
PROT UR STRIP-MCNC: 100 MG/DL
PROTHROMBIN TIME: 11.9 SEC (ref 9.6–11.3)
RBC # BLD AUTO: 4.59 M/UL (ref 4.33–5.41)
RBC # URNS HPF: (no result) /HPF
RBC UR QL: NEGATIVE
SP GR UR STRIP: 1.02 (ref 1–1.03)
UROBILINOGEN UR STRIP-ACNC: 1 E.U./DL (ref 0.2–1)
WBC # BLD AUTO: 13.6 K/UL (ref 4–10.2)
WBC UR QL: (no result) /HPF

## 2023-04-30 RX ADMIN — Medication PRN ML: at 23:02

## 2023-04-30 RX ADMIN — Medication PRN ML: at 23:35

## 2023-04-30 RX ADMIN — HYDROCODONE BITATRATE AND ACETAMINOPHEN PRN TAB: 5; 325 TABLET ORAL at 11:57

## 2023-05-01 LAB
BASE EXCESS BLDA CALC-SCNC: -4 MMOL/L (ref -2–3)
CO2 BLDA-SCNC: 15.9 MMOL/L (ref 23–27)
HCO3 BLDA-SCNC: 17 MMOL/L (ref 22–26)
INR PPP: 1.1
PCO2 BLDA: 22 MMHG (ref 35–48)
PH BLDA: 7.5 PH (ref 7.35–7.45)
PO2 BLDA: 66 MMHG (ref 83–108)
PROTHROMBIN TIME: 11.3 SEC (ref 9.6–11.3)
SAO2 % BLDA: 95 % (ref 95–98)

## 2023-05-01 RX ADMIN — Medication PRN ML: at 01:06

## 2023-05-01 RX ADMIN — HYDROCODONE BITATRATE AND ACETAMINOPHEN PRN TAB: 5; 325 TABLET ORAL at 21:19

## 2023-05-01 RX ADMIN — Medication PRN ML: at 02:58

## 2023-05-01 RX ADMIN — Medication PRN ML: at 21:23

## 2023-05-01 RX ADMIN — HYDROCODONE BITATRATE AND ACETAMINOPHEN PRN TAB: 5; 325 TABLET ORAL at 02:14

## 2023-05-01 RX ADMIN — Medication PRN ML: at 13:17

## 2023-05-01 RX ADMIN — Medication PRN ML: at 00:07

## 2023-05-02 VITALS — DIASTOLIC BLOOD PRESSURE: 99 MMHG | SYSTOLIC BLOOD PRESSURE: 157 MMHG

## 2023-05-02 VITALS — HEART RATE: 126 BPM

## 2023-05-02 LAB
ANION GAP SERPL CALC-SCNC: 15.5 MEQ/L (ref 7–15)
BASOPHILS # BLD AUTO: 0.01 K/UL (ref 0–0.2)
BASOPHILS NFR BLD AUTO: 0.1 % (ref 0–2)
BUN SERPL-MCNC: 10 MG/DL (ref 7–18)
CALCIUM SERPL-MCNC: 8.3 MG/DL (ref 8.5–10.1)
CHLORIDE SERPL-SCNC: 102 MMOL/L (ref 98–107)
CO2 SERPL-SCNC: 23 MMOL/L (ref 21–32)
CREAT CL 24H UR+SERPL-VRATE: 81.72 ML/MIN
CREAT SERPL-MCNC: 0.91 MG/DL (ref 0.51–1.17)
EGFRCR SERPLBLD CKD-EPI 2021: 97 ML/MIN (ref 60–?)
EOSINOPHIL # BLD AUTO: 0.05 K/UL (ref 0–0.5)
EOSINOPHIL NFR BLD AUTO: 0.3 % (ref 0–5)
ERYTHROCYTE [DISTWIDTH] IN BLOOD BY AUTOMATED COUNT: 16 % (ref 11.2–14.1)
GLUCOSE SERPL-MCNC: 95 MG/DL (ref 70–99)
HCT VFR BLD AUTO: 34.1 % (ref 39–49)
HGB BLD-MCNC: 11.3 G/DL (ref 13.1–16.8)
LYMPHOCYTES # BLD AUTO: 0.82 K/UL (ref 0.5–3.5)
LYMPHOCYTES NFR BLD AUTO: 5.1 % (ref 10–50)
MCH RBC QN AUTO: 25.3 PG (ref 28.2–33.3)
MCHC RBC AUTO-ENTMCNC: 33.1 G/DL (ref 31.7–36)
MCHC RBC AUTO-ENTMCNC: 76.5 FL (ref 84–98)
MONOCYTES # BLD AUTO: 0.76 K/UL (ref 0–1)
MONOCYTES NFR BLD AUTO: 4.7 % (ref 2–14)
NEUTROPHILS # BLD AUTO: 14.39 K/UL (ref 1.4–7)
NEUTROPHILS NFR BLD AUTO: 89.8 % (ref 45–80)
PLATELET # BLD AUTO: 215 K/UL (ref 150–350)
POTASSIUM SERPL-SCNC: 2.5 MMOL/L (ref 3.5–5.1)
RBC # BLD AUTO: 4.46 M/UL (ref 4.33–5.41)
SODIUM SERPL-SCNC: 138 MMOL/L (ref 136–145)
WBC # BLD AUTO: 16 K/UL (ref 4–10.2)

## 2023-05-02 RX ADMIN — HYDROCODONE BITATRATE AND ACETAMINOPHEN PRN TAB: 5; 325 TABLET ORAL at 14:57

## 2023-05-02 RX ADMIN — Medication PRN ML: at 05:40

## 2023-05-02 RX ADMIN — POTASSIUM CHLORIDE SCH MEQ: 1500 TABLET, EXTENDED RELEASE ORAL at 09:17

## 2023-05-02 RX ADMIN — POTASSIUM CHLORIDE SCH MEQ: 1500 TABLET, EXTENDED RELEASE ORAL at 12:31

## 2023-05-02 RX ADMIN — Medication PRN ML: at 08:15

## 2023-05-02 RX ADMIN — Medication PRN ML: at 00:21

## 2023-05-02 RX ADMIN — Medication PRN ML: at 06:17

## 2023-05-02 RX ADMIN — Medication PRN ML: at 01:54

## 2023-05-02 RX ADMIN — HYDROCODONE BITATRATE AND ACETAMINOPHEN PRN TAB: 5; 325 TABLET ORAL at 05:39

## 2023-06-21 ENCOUNTER — HOSPITAL ENCOUNTER (EMERGENCY)
Dept: HOSPITAL 52 - LL.ED | Age: 58
Discharge: HOME | End: 2023-06-21
Payer: MEDICARE

## 2023-06-21 VITALS — HEART RATE: 89 BPM | DIASTOLIC BLOOD PRESSURE: 93 MMHG | SYSTOLIC BLOOD PRESSURE: 157 MMHG

## 2023-06-21 DIAGNOSIS — E78.5: ICD-10-CM

## 2023-06-21 DIAGNOSIS — E87.6: ICD-10-CM

## 2023-06-21 DIAGNOSIS — Z79.899: ICD-10-CM

## 2023-06-21 DIAGNOSIS — E86.0: Primary | ICD-10-CM

## 2023-06-21 DIAGNOSIS — K21.9: ICD-10-CM

## 2023-06-21 DIAGNOSIS — E78.00: ICD-10-CM

## 2023-06-21 DIAGNOSIS — J44.9: ICD-10-CM

## 2023-06-21 DIAGNOSIS — I48.91: ICD-10-CM

## 2023-06-21 DIAGNOSIS — E03.9: ICD-10-CM

## 2023-06-21 DIAGNOSIS — I10: ICD-10-CM

## 2023-06-21 LAB
ALBUMIN SERPL-MCNC: 3.8 G/DL (ref 3.4–5)
ALP SERPL-CCNC: 138 IU/L (ref 46–116)
ALT SERPL-CCNC: 69 U/L (ref 12–78)
ANION GAP SERPL CALC-SCNC: 17.6 MEQ/L (ref 7–15)
APAP SERPL-SCNC: 0 UG/ML (ref 10–30)
AST SERPL-CCNC: 44 U/L (ref 15–37)
BASOPHILS # BLD AUTO: 0.01 K/UL (ref 0–0.2)
BASOPHILS NFR BLD AUTO: 0.1 % (ref 0–2)
BILIRUB SERPL-MCNC: 0.6 MG/DL (ref 0.2–1)
BUN SERPL-MCNC: 9 MG/DL (ref 7–18)
CALCIUM SERPL-MCNC: 8.8 MG/DL (ref 8.5–10.1)
CHLORIDE SERPL-SCNC: 103 MMOL/L (ref 98–107)
CO2 SERPL-SCNC: 20.4 MMOL/L (ref 21–32)
CREAT CL 24H UR+SERPL-VRATE: (no result) ML/MIN
CREAT SERPL-MCNC: 1.13 MG/DL (ref 0.51–1.17)
EGFRCR SERPLBLD CKD-EPI 2021: 75 ML/MIN (ref 60–?)
EOSINOPHIL # BLD AUTO: 0.03 K/UL (ref 0–0.5)
EOSINOPHIL NFR BLD AUTO: 0.3 % (ref 0–5)
ERYTHROCYTE [DISTWIDTH] IN BLOOD BY AUTOMATED COUNT: 18.8 % (ref 11.2–14.1)
ETHANOL BLD-MCNC: 0.02 G/DL (ref 0–0.08)
GLUCOSE SERPL-MCNC: 111 MG/DL (ref 70–99)
HCT VFR BLD AUTO: 41.2 % (ref 39–49)
HGB BLD-MCNC: 13.6 G/DL (ref 13.1–16.8)
INR PPP: 1
LACTATE SERPL-SCNC: 2.3 MMOL/L (ref 0.4–2)
LYMPHOCYTES # BLD AUTO: 1.8 K/UL (ref 0.5–3.5)
LYMPHOCYTES NFR BLD AUTO: 20.2 % (ref 10–50)
MCH RBC QN AUTO: 24.2 PG (ref 28.2–33.3)
MCHC RBC AUTO-ENTMCNC: 33 G/DL (ref 31.7–36)
MCHC RBC AUTO-ENTMCNC: 73.3 FL (ref 84–98)
MONOCYTES # BLD AUTO: 0.55 K/UL (ref 0–1)
MONOCYTES NFR BLD AUTO: 6.2 % (ref 2–14)
NEUTROPHILS # BLD AUTO: 6.51 K/UL (ref 1.4–7)
NEUTROPHILS NFR BLD AUTO: 73.2 % (ref 45–80)
PLATELET # BLD AUTO: 252 K/UL (ref 150–350)
POTASSIUM SERPL-SCNC: 3 MMOL/L (ref 3.5–5.1)
PROT SERPL-MCNC: 8.1 G/DL (ref 6.4–8.2)
PROTHROMBIN TIME: 10.4 SEC (ref 9–11.1)
RBC # BLD AUTO: 5.62 M/UL (ref 4.33–5.41)
SODIUM SERPL-SCNC: 138 MMOL/L (ref 136–145)
WBC # BLD AUTO: 8.9 K/UL (ref 4–10.2)

## 2023-06-21 RX ADMIN — POTASSIUM CHLORIDE SCH MLS/HR: 200 INJECTION, SOLUTION INTRAVENOUS at 13:55

## 2023-06-21 RX ADMIN — POTASSIUM CHLORIDE SCH MLS/HR: 200 INJECTION, SOLUTION INTRAVENOUS at 15:32

## 2023-07-07 ENCOUNTER — HOSPITAL ENCOUNTER (EMERGENCY)
Dept: HOSPITAL 52 - LL.ED | Age: 58
Discharge: HOME | End: 2023-07-07
Payer: MEDICARE

## 2023-07-07 VITALS — DIASTOLIC BLOOD PRESSURE: 78 MMHG | SYSTOLIC BLOOD PRESSURE: 142 MMHG | HEART RATE: 91 BPM

## 2023-07-07 DIAGNOSIS — E78.00: ICD-10-CM

## 2023-07-07 DIAGNOSIS — I10: ICD-10-CM

## 2023-07-07 DIAGNOSIS — M19.90: ICD-10-CM

## 2023-07-07 DIAGNOSIS — E03.9: ICD-10-CM

## 2023-07-07 DIAGNOSIS — J44.9: ICD-10-CM

## 2023-07-07 DIAGNOSIS — K21.9: ICD-10-CM

## 2023-07-07 DIAGNOSIS — F10.120: Primary | ICD-10-CM

## 2023-07-07 DIAGNOSIS — I48.91: ICD-10-CM

## 2023-07-07 DIAGNOSIS — Z88.8: ICD-10-CM

## 2023-07-07 DIAGNOSIS — Z79.899: ICD-10-CM

## 2023-07-07 LAB
ALBUMIN SERPL-MCNC: 3.5 G/DL (ref 3.4–5)
ALP SERPL-CCNC: 147 IU/L (ref 46–116)
ALT SERPL-CCNC: 63 U/L (ref 12–78)
AMPHET UR QL SCN: NEGATIVE
AMPHET UR QL SCN: NEGATIVE
ANION GAP SERPL CALC-SCNC: 13.7 MEQ/L (ref 7–15)
APPEARANCE UR: CLEAR
AST SERPL-CCNC: 53 U/L (ref 15–37)
BARBITURATES UR QL SCN: NEGATIVE
BASOPHILS # BLD AUTO: 0.03 K/UL (ref 0–0.2)
BASOPHILS NFR BLD AUTO: 0.3 % (ref 0–2)
BENZODIAZ UR QL SCN: POSITIVE
BILIRUB SERPL-MCNC: 0.4 MG/DL (ref 0.2–1)
BILIRUB UR STRIP-MCNC: NEGATIVE MG/DL
BUN SERPL-MCNC: 7 MG/DL (ref 7–18)
BUPRENORPHINE UR QL: NEGATIVE
CALCIUM SERPL-MCNC: 8.8 MG/DL (ref 8.5–10.1)
CHLORIDE SERPL-SCNC: 106 MMOL/L (ref 98–107)
CO2 SERPL-SCNC: 24.7 MMOL/L (ref 21–32)
COCAINE UR QL SCN: NEGATIVE
COLOR UR: YELLOW
CREAT CL 24H UR+SERPL-VRATE: (no result) ML/MIN
CREAT SERPL-MCNC: 1.01 MG/DL (ref 0.51–1.17)
EDDP UR QL: NEGATIVE
EGFRCR SERPLBLD CKD-EPI 2021: 86 ML/MIN (ref 60–?)
EOSINOPHIL # BLD AUTO: 0.1 K/UL (ref 0–0.5)
EOSINOPHIL NFR BLD AUTO: 1.1 % (ref 0–5)
ERYTHROCYTE [DISTWIDTH] IN BLOOD BY AUTOMATED COUNT: 19.9 % (ref 11.2–14.1)
ETHANOL BLD-MCNC: 0.23 G/DL (ref 0–0.08)
GLUCOSE SERPL-MCNC: 105 MG/DL (ref 70–99)
GLUCOSE UR STRIP-MCNC: NEGATIVE MG/DL
HCT VFR BLD AUTO: 40.8 % (ref 39–49)
HGB BLD-MCNC: 13.5 G/DL (ref 13.1–16.8)
INR PPP: 1.1
KETONES UR STRIP-MCNC: NEGATIVE MG/DL
LACTATE SERPL-SCNC: 2 MMOL/L (ref 0.4–2)
LYMPHOCYTES # BLD AUTO: 2.32 K/UL (ref 0.5–3.5)
LYMPHOCYTES NFR BLD AUTO: 25.8 % (ref 10–50)
MCH RBC QN AUTO: 24.9 PG (ref 28.2–33.3)
MCHC RBC AUTO-ENTMCNC: 33.1 G/DL (ref 31.7–36)
MCHC RBC AUTO-ENTMCNC: 75.1 FL (ref 84–98)
MONOCYTES # BLD AUTO: 0.45 K/UL (ref 0–1)
MONOCYTES NFR BLD AUTO: 5 % (ref 2–14)
NEUTROPHILS # BLD AUTO: 6.09 K/UL (ref 1.4–7)
NEUTROPHILS NFR BLD AUTO: 67.8 % (ref 45–80)
NITRITE UR QL: NEGATIVE
OXYCODONE UR QL SCN: NEGATIVE
PH UR STRIP: 6.5 [PH] (ref 5–9)
PLATELET # BLD AUTO: 249 K/UL (ref 150–350)
POTASSIUM SERPL-SCNC: 3.4 MMOL/L (ref 3.5–5.1)
PROT SERPL-MCNC: 7.5 G/DL (ref 6.4–8.2)
PROT UR STRIP-MCNC: NEGATIVE MG/DL
PROTHROMBIN TIME: 10.7 SEC (ref 9–11.1)
RBC # BLD AUTO: 5.43 M/UL (ref 4.33–5.41)
RBC UR QL: NEGATIVE
SODIUM SERPL-SCNC: 141 MMOL/L (ref 136–145)
SP GR UR STRIP: <= 1.005 (ref 1–1.03)
TCA UR-MCNC: NEGATIVE UG/ML
THC UR QL SCN>50 NG/ML: NEGATIVE
UROBILINOGEN UR STRIP-ACNC: 0.2 E.U./DL (ref 0.2–1)
WBC # BLD AUTO: 9 K/UL (ref 4–10.2)

## 2023-07-24 ENCOUNTER — HOSPITAL ENCOUNTER (EMERGENCY)
Dept: HOSPITAL 52 - LL.ED | Age: 58
Discharge: HOME | End: 2023-07-24
Payer: MEDICARE

## 2023-07-24 VITALS — HEART RATE: 120 BPM

## 2023-07-24 VITALS — DIASTOLIC BLOOD PRESSURE: 86 MMHG | SYSTOLIC BLOOD PRESSURE: 143 MMHG

## 2023-07-24 DIAGNOSIS — M19.90: ICD-10-CM

## 2023-07-24 DIAGNOSIS — Z91.148: ICD-10-CM

## 2023-07-24 DIAGNOSIS — I48.91: ICD-10-CM

## 2023-07-24 DIAGNOSIS — E86.0: Primary | ICD-10-CM

## 2023-07-24 DIAGNOSIS — Z79.899: ICD-10-CM

## 2023-07-24 DIAGNOSIS — J44.9: ICD-10-CM

## 2023-07-24 DIAGNOSIS — Z88.8: ICD-10-CM

## 2023-07-24 DIAGNOSIS — I10: ICD-10-CM

## 2023-07-24 DIAGNOSIS — K21.9: ICD-10-CM

## 2023-07-24 DIAGNOSIS — E78.00: ICD-10-CM

## 2023-07-24 DIAGNOSIS — E03.9: ICD-10-CM

## 2023-07-24 DIAGNOSIS — F41.8: ICD-10-CM

## 2023-07-24 LAB
ALBUMIN SERPL-MCNC: 3.6 G/DL (ref 3.4–5)
ALP SERPL-CCNC: 142 IU/L (ref 46–116)
ALT SERPL-CCNC: 36 U/L (ref 12–78)
AMPHET UR QL SCN: NEGATIVE
AMPHET UR QL SCN: NEGATIVE
ANION GAP SERPL CALC-SCNC: 11.3 MEQ/L (ref 7–15)
APPEARANCE UR: CLEAR
AST SERPL-CCNC: 37 U/L (ref 15–37)
BACTERIA URNS QL MICRO: (no result) /HPF
BARBITURATES UR QL SCN: NEGATIVE
BASOPHILS # BLD AUTO: 0.02 K/UL (ref 0–0.2)
BASOPHILS NFR BLD AUTO: 0.2 % (ref 0–2)
BENZODIAZ UR QL SCN: POSITIVE
BILIRUB SERPL-MCNC: 0.4 MG/DL (ref 0.2–1)
BILIRUB UR STRIP-MCNC: (no result) MG/DL
BUN SERPL-MCNC: 11 MG/DL (ref 7–18)
BUPRENORPHINE UR QL: NEGATIVE
CALCIUM SERPL-MCNC: 9 MG/DL (ref 8.5–10.1)
CHLORIDE SERPL-SCNC: 103 MMOL/L (ref 98–107)
CK SERPL-CCNC: 152 U/L (ref 26–308)
CO2 SERPL-SCNC: 23.7 MMOL/L (ref 21–32)
COCAINE UR QL SCN: NEGATIVE
COLOR UR: (no result)
CREAT CL 24H UR+SERPL-VRATE: (no result) ML/MIN
CREAT SERPL-MCNC: 1.04 MG/DL (ref 0.51–1.17)
EDDP UR QL: NEGATIVE
EGFRCR SERPLBLD CKD-EPI 2021: 83 ML/MIN (ref 60–?)
EOSINOPHIL # BLD AUTO: 0.05 K/UL (ref 0–0.5)
EOSINOPHIL NFR BLD AUTO: 0.6 % (ref 0–5)
EPI CELLS #/AREA URNS HPF: (no result) /LPF
ERYTHROCYTE [DISTWIDTH] IN BLOOD BY AUTOMATED COUNT: 21.6 % (ref 11.2–14.1)
ETHANOL BLD-MCNC: 0 G/DL (ref 0–0.08)
GLUCOSE SERPL-MCNC: 108 MG/DL (ref 70–99)
GLUCOSE UR STRIP-MCNC: NEGATIVE MG/DL
HCT VFR BLD AUTO: 43.2 % (ref 39–49)
HGB BLD-MCNC: 14.1 G/DL (ref 13.1–16.8)
KETONES UR STRIP-MCNC: NEGATIVE MG/DL
LYMPHOCYTES # BLD AUTO: 1.67 K/UL (ref 0.5–3.5)
LYMPHOCYTES NFR BLD AUTO: 20.3 % (ref 10–50)
MCH RBC QN AUTO: 25.5 PG (ref 28.2–33.3)
MCHC RBC AUTO-ENTMCNC: 32.6 G/DL (ref 31.7–36)
MCHC RBC AUTO-ENTMCNC: 78.3 FL (ref 84–98)
MONOCYTES # BLD AUTO: 0.66 K/UL (ref 0–1)
MONOCYTES NFR BLD AUTO: 8 % (ref 2–14)
MUCOUS THREADS URNS QL MICRO: (no result) /LPF
NEUTROPHILS # BLD AUTO: 5.82 K/UL (ref 1.4–7)
NEUTROPHILS NFR BLD AUTO: 70.9 % (ref 45–80)
NITRITE UR QL: NEGATIVE
OXYCODONE UR QL SCN: NEGATIVE
PH UR STRIP: 5.5 [PH] (ref 5–9)
PLATELET # BLD AUTO: 284 K/UL (ref 150–350)
POTASSIUM SERPL-SCNC: 4 MMOL/L (ref 3.5–5.1)
PROT SERPL-MCNC: 8 G/DL (ref 6.4–8.2)
PROT UR STRIP-MCNC: 30 MG/DL
RBC # BLD AUTO: 5.52 M/UL (ref 4.33–5.41)
RBC # URNS HPF: (no result) /HPF
RBC UR QL: NEGATIVE
SODIUM SERPL-SCNC: 138 MMOL/L (ref 136–145)
SP GR UR STRIP: >= 1.03 (ref 1–1.03)
TCA UR-MCNC: NEGATIVE UG/ML
THC UR QL SCN>50 NG/ML: POSITIVE
UROBILINOGEN UR STRIP-ACNC: 0.2 E.U./DL (ref 0.2–1)
WBC # BLD AUTO: 8.2 K/UL (ref 4–10.2)
WBC UR QL: (no result) /HPF

## 2023-07-24 RX ADMIN — ONDANSETRON PRN MG: 2 INJECTION, SOLUTION INTRAMUSCULAR; INTRAVENOUS at 08:07

## 2023-07-24 RX ADMIN — ONDANSETRON PRN MG: 2 INJECTION, SOLUTION INTRAMUSCULAR; INTRAVENOUS at 11:53

## 2023-07-26 ENCOUNTER — HOSPITAL ENCOUNTER (EMERGENCY)
Dept: HOSPITAL 52 - LL.ED | Age: 58
Discharge: PSYCHIATRIC OR SUBSTANCE USE FACILITY | End: 2023-07-26
Payer: MEDICARE

## 2023-07-26 VITALS — DIASTOLIC BLOOD PRESSURE: 92 MMHG | SYSTOLIC BLOOD PRESSURE: 153 MMHG | HEART RATE: 79 BPM

## 2023-07-26 DIAGNOSIS — E78.00: ICD-10-CM

## 2023-07-26 DIAGNOSIS — R45.851: ICD-10-CM

## 2023-07-26 DIAGNOSIS — I48.91: ICD-10-CM

## 2023-07-26 DIAGNOSIS — I10: ICD-10-CM

## 2023-07-26 DIAGNOSIS — F10.10: Primary | ICD-10-CM

## 2023-07-26 DIAGNOSIS — E03.9: ICD-10-CM

## 2023-07-26 DIAGNOSIS — K21.9: ICD-10-CM

## 2023-07-26 DIAGNOSIS — Z88.8: ICD-10-CM

## 2023-07-26 DIAGNOSIS — J44.9: ICD-10-CM

## 2023-07-26 DIAGNOSIS — Z79.899: ICD-10-CM

## 2023-07-26 LAB
ALBUMIN SERPL-MCNC: 3.7 G/DL (ref 3.4–5)
ALP SERPL-CCNC: 147 IU/L (ref 46–116)
ALT SERPL-CCNC: 34 U/L (ref 12–78)
AMPHET UR QL SCN: NEGATIVE
AMPHET UR QL SCN: NEGATIVE
ANION GAP SERPL CALC-SCNC: 13.4 MEQ/L (ref 7–15)
AST SERPL-CCNC: 37 U/L (ref 15–37)
BARBITURATES UR QL SCN: NEGATIVE
BASOPHILS # BLD AUTO: 0.03 K/UL (ref 0–0.2)
BASOPHILS NFR BLD AUTO: 0.4 % (ref 0–2)
BENZODIAZ UR QL SCN: POSITIVE
BILIRUB SERPL-MCNC: 0.5 MG/DL (ref 0.2–1)
BUN SERPL-MCNC: 12 MG/DL (ref 7–18)
BUPRENORPHINE UR QL: NEGATIVE
CALCIUM SERPL-MCNC: 8.8 MG/DL (ref 8.5–10.1)
CHLORIDE SERPL-SCNC: 105 MMOL/L (ref 98–107)
CK SERPL-CCNC: 167 U/L (ref 26–308)
CO2 SERPL-SCNC: 21.6 MMOL/L (ref 21–32)
COCAINE UR QL SCN: NEGATIVE
CREAT CL 24H UR+SERPL-VRATE: 91.42 ML/MIN
CREAT SERPL-MCNC: 0.87 MG/DL (ref 0.51–1.17)
CRP SERPL-MCNC: 0.5 MG/DL (ref ?–0.9)
EDDP UR QL: NEGATIVE
EGFRCR SERPLBLD CKD-EPI 2021: 99 ML/MIN (ref 60–?)
EOSINOPHIL # BLD AUTO: 0.09 K/UL (ref 0–0.5)
EOSINOPHIL NFR BLD AUTO: 1.1 % (ref 0–5)
ERYTHROCYTE [DISTWIDTH] IN BLOOD BY AUTOMATED COUNT: 20.9 % (ref 11.2–14.1)
ETHANOL BLD-MCNC: 0 G/DL (ref 0–0.08)
GLUCOSE SERPL-MCNC: 91 MG/DL (ref 70–99)
HCT VFR BLD AUTO: 42 % (ref 39–49)
HGB BLD-MCNC: 13.6 G/DL (ref 13.1–16.8)
LACTATE DEHYDROGENASE,LDH: 211 U/L (ref 81–234)
LIPASE SERPL-CCNC: 96 U/L (ref 73–393)
LYMPHOCYTES # BLD AUTO: 1.75 K/UL (ref 0.5–3.5)
LYMPHOCYTES NFR BLD AUTO: 21.7 % (ref 10–50)
MAGNESIUM SERPL-MCNC: 1.8 MG/DL (ref 1.8–2.4)
MCH RBC QN AUTO: 25.8 PG (ref 28.2–33.3)
MCHC RBC AUTO-ENTMCNC: 32.4 G/DL (ref 31.7–36)
MCHC RBC AUTO-ENTMCNC: 79.7 FL (ref 84–98)
MONOCYTES # BLD AUTO: 0.61 K/UL (ref 0–1)
MONOCYTES NFR BLD AUTO: 7.6 % (ref 2–14)
NEUTROPHILS # BLD AUTO: 5.59 K/UL (ref 1.4–7)
NEUTROPHILS NFR BLD AUTO: 69.2 % (ref 45–80)
OXYCODONE UR QL SCN: NEGATIVE
PLATELET # BLD AUTO: 244 K/UL (ref 150–350)
POTASSIUM SERPL-SCNC: 3.7 MMOL/L (ref 3.5–5.1)
PROT SERPL-MCNC: 8 G/DL (ref 6.4–8.2)
RBC # BLD AUTO: 5.27 M/UL (ref 4.33–5.41)
SODIUM SERPL-SCNC: 140 MMOL/L (ref 136–145)
TCA UR-MCNC: NEGATIVE UG/ML
THC UR QL SCN>50 NG/ML: POSITIVE
WBC # BLD AUTO: 8.1 K/UL (ref 4–10.2)

## 2023-07-26 PROCEDURE — C9113 INJ PANTOPRAZOLE SODIUM, VIA: HCPCS

## 2023-08-23 ENCOUNTER — HOSPITAL ENCOUNTER (EMERGENCY)
Dept: HOSPITAL 52 - LL.ED | Age: 58
Discharge: HOME | End: 2023-08-23
Payer: MEDICARE

## 2023-08-23 VITALS — SYSTOLIC BLOOD PRESSURE: 165 MMHG | HEART RATE: 103 BPM | DIASTOLIC BLOOD PRESSURE: 97 MMHG

## 2023-08-23 DIAGNOSIS — J44.9: ICD-10-CM

## 2023-08-23 DIAGNOSIS — K21.9: ICD-10-CM

## 2023-08-23 DIAGNOSIS — Z79.899: ICD-10-CM

## 2023-08-23 DIAGNOSIS — I48.91: ICD-10-CM

## 2023-08-23 DIAGNOSIS — Z88.8: ICD-10-CM

## 2023-08-23 DIAGNOSIS — E87.6: ICD-10-CM

## 2023-08-23 DIAGNOSIS — E78.00: ICD-10-CM

## 2023-08-23 DIAGNOSIS — F10.10: Primary | ICD-10-CM

## 2023-08-23 DIAGNOSIS — I10: ICD-10-CM

## 2023-08-23 DIAGNOSIS — E03.9: ICD-10-CM

## 2023-08-23 LAB
ALBUMIN SERPL-MCNC: 3.4 G/DL (ref 3.4–5)
ALP SERPL-CCNC: 183 IU/L (ref 46–116)
ALT SERPL-CCNC: 52 U/L (ref 12–78)
AMPHET UR QL SCN: NEGATIVE
AMPHET UR QL SCN: NEGATIVE
ANION GAP SERPL CALC-SCNC: 19.1 MEQ/L (ref 7–15)
APPEARANCE UR: (no result)
AST SERPL-CCNC: 73 U/L (ref 15–37)
BACTERIA URNS QL MICRO: (no result) /HPF
BARBITURATES UR QL SCN: NEGATIVE
BASOPHILS # BLD AUTO: 0.03 K/UL (ref 0–0.2)
BASOPHILS NFR BLD AUTO: 0.3 % (ref 0–2)
BENZODIAZ UR QL SCN: NEGATIVE
BILIRUB SERPL-MCNC: 0.7 MG/DL (ref 0.2–1)
BILIRUB UR STRIP-MCNC: NEGATIVE MG/DL
BUN SERPL-MCNC: 7 MG/DL (ref 7–18)
BUPRENORPHINE UR QL: NEGATIVE
CALCIUM SERPL-MCNC: 8.1 MG/DL (ref 8.5–10.1)
CHLORIDE SERPL-SCNC: 100 MMOL/L (ref 98–107)
CO2 SERPL-SCNC: 20.9 MMOL/L (ref 21–32)
COCAINE UR QL SCN: NEGATIVE
COLOR UR: (no result)
CREAT CL 24H UR+SERPL-VRATE: (no result) ML/MIN
CREAT SERPL-MCNC: 0.92 MG/DL (ref 0.51–1.17)
EDDP UR QL: NEGATIVE
EGFRCR SERPLBLD CKD-EPI 2021: 96 ML/MIN (ref 60–?)
EOSINOPHIL # BLD AUTO: 0.05 K/UL (ref 0–0.5)
EOSINOPHIL NFR BLD AUTO: 0.6 % (ref 0–5)
EPI CELLS #/AREA URNS HPF: (no result) /LPF
ERYTHROCYTE [DISTWIDTH] IN BLOOD BY AUTOMATED COUNT: 21.9 % (ref 11.2–14.1)
ETHANOL BLD-MCNC: 0.02 G/DL (ref 0–0.08)
GLUCOSE SERPL-MCNC: 128 MG/DL (ref 70–99)
GLUCOSE UR STRIP-MCNC: NEGATIVE MG/DL
HCT VFR BLD AUTO: 42.6 % (ref 39–49)
HGB BLD-MCNC: 14.5 G/DL (ref 13.1–16.8)
KETONES UR STRIP-MCNC: NEGATIVE MG/DL
LYMPHOCYTES # BLD AUTO: 1 K/UL (ref 0.5–3.5)
LYMPHOCYTES NFR BLD AUTO: 11.3 % (ref 10–50)
MCH RBC QN AUTO: 26.3 PG (ref 28.2–33.3)
MCHC RBC AUTO-ENTMCNC: 34 G/DL (ref 31.7–36)
MCHC RBC AUTO-ENTMCNC: 77.3 FL (ref 84–98)
MONOCYTES # BLD AUTO: 0.52 K/UL (ref 0–1)
MONOCYTES NFR BLD AUTO: 5.9 % (ref 2–14)
NEUTROPHILS # BLD AUTO: 7.26 K/UL (ref 1.4–7)
NEUTROPHILS NFR BLD AUTO: 81.9 % (ref 45–80)
NITRITE UR QL: NEGATIVE
OXYCODONE UR QL SCN: NEGATIVE
PH UR STRIP: 7 [PH] (ref 5–9)
PLATELET # BLD AUTO: 142 K/UL (ref 150–350)
POTASSIUM SERPL-SCNC: 3 MMOL/L (ref 3.5–5.1)
PROT SERPL-MCNC: 7.2 G/DL (ref 6.4–8.2)
PROT UR STRIP-MCNC: (no result) MG/DL
RBC # BLD AUTO: 5.51 M/UL (ref 4.33–5.41)
RBC # URNS HPF: (no result) /HPF
RBC UR QL: NEGATIVE
SODIUM SERPL-SCNC: 137 MMOL/L (ref 136–145)
SP GR UR STRIP: 1.01 (ref 1–1.03)
TCA UR-MCNC: POSITIVE UG/ML
THC UR QL SCN>50 NG/ML: POSITIVE
UROBILINOGEN UR STRIP-ACNC: 0.2 E.U./DL (ref 0.2–1)
WBC # BLD AUTO: 8.9 K/UL (ref 4–10.2)
WBC UR QL: (no result) /HPF

## 2023-08-26 ENCOUNTER — HOSPITAL ENCOUNTER (OUTPATIENT)
Dept: HOSPITAL 52 - LL.ED | Age: 58
Setting detail: OBSERVATION
LOS: 1 days | Discharge: NURSING, REHAB OR LONG-TERM CARE | End: 2023-08-27
Attending: PHYSICIAN ASSISTANT | Admitting: PHYSICIAN ASSISTANT
Payer: MEDICARE
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
## 2023-08-26 DIAGNOSIS — F10.129: ICD-10-CM

## 2023-08-26 DIAGNOSIS — E83.42: ICD-10-CM

## 2023-08-26 DIAGNOSIS — F51.04: ICD-10-CM

## 2023-08-26 DIAGNOSIS — J44.9: ICD-10-CM

## 2023-08-26 DIAGNOSIS — I48.91: Primary | ICD-10-CM

## 2023-08-26 DIAGNOSIS — E87.1: ICD-10-CM

## 2023-08-26 DIAGNOSIS — E66.9: ICD-10-CM

## 2023-08-26 DIAGNOSIS — Z79.890: ICD-10-CM

## 2023-08-26 DIAGNOSIS — K58.9: ICD-10-CM

## 2023-08-26 DIAGNOSIS — R41.0: ICD-10-CM

## 2023-08-26 DIAGNOSIS — F32.A: ICD-10-CM

## 2023-08-26 DIAGNOSIS — K21.9: ICD-10-CM

## 2023-08-26 DIAGNOSIS — E87.6: ICD-10-CM

## 2023-08-26 DIAGNOSIS — E78.00: ICD-10-CM

## 2023-08-26 DIAGNOSIS — Z79.899: ICD-10-CM

## 2023-08-26 DIAGNOSIS — N40.0: ICD-10-CM

## 2023-08-26 DIAGNOSIS — F10.139: ICD-10-CM

## 2023-08-26 DIAGNOSIS — E86.0: ICD-10-CM

## 2023-08-26 DIAGNOSIS — Z88.8: ICD-10-CM

## 2023-08-26 DIAGNOSIS — I10: ICD-10-CM

## 2023-08-26 DIAGNOSIS — I47.29: ICD-10-CM

## 2023-08-26 DIAGNOSIS — R79.89: ICD-10-CM

## 2023-08-26 DIAGNOSIS — F41.9: ICD-10-CM

## 2023-08-26 DIAGNOSIS — G62.9: ICD-10-CM

## 2023-08-26 DIAGNOSIS — E03.9: ICD-10-CM

## 2023-08-26 LAB
ALBUMIN SERPL-MCNC: 3.8 G/DL (ref 3.4–5)
ALP SERPL-CCNC: 202 IU/L (ref 46–116)
ALT SERPL-CCNC: 47 U/L (ref 12–78)
AMPHET UR QL SCN: NEGATIVE
AMPHET UR QL SCN: NEGATIVE
ANION GAP SERPL CALC-SCNC: 16.8 MEQ/L (ref 7–15)
APPEARANCE UR: (no result)
AST SERPL-CCNC: 76 U/L (ref 15–37)
BARBITURATES UR QL SCN: NEGATIVE
BASOPHILS # BLD AUTO: 0.02 K/UL (ref 0–0.2)
BASOPHILS NFR BLD AUTO: 0.2 % (ref 0–2)
BENZODIAZ UR QL SCN: NEGATIVE
BILIRUB SERPL-MCNC: 0.8 MG/DL (ref 0.2–1)
BILIRUB UR STRIP-MCNC: NEGATIVE MG/DL
BNP SERPL-MCNC: 34 PG/ML (ref 0–125)
BUN SERPL-MCNC: 5 MG/DL (ref 7–18)
BUPRENORPHINE UR QL: NEGATIVE
CALCIUM SERPL-MCNC: 8.3 MG/DL (ref 8.5–10.1)
CHLORIDE SERPL-SCNC: 101 MMOL/L (ref 98–107)
CK SERPL-CCNC: 433 U/L (ref 26–308)
CO2 SERPL-SCNC: 24 MMOL/L (ref 21–32)
COCAINE UR QL SCN: NEGATIVE
COLOR UR: YELLOW
CREAT CL 24H UR+SERPL-VRATE: (no result) ML/MIN
CREAT SERPL-MCNC: 0.96 MG/DL (ref 0.51–1.17)
EDDP UR QL: NEGATIVE
EGFRCR SERPLBLD CKD-EPI 2021: 91 ML/MIN (ref 60–?)
EOSINOPHIL # BLD AUTO: 0.12 K/UL (ref 0–0.5)
EOSINOPHIL NFR BLD AUTO: 1.4 % (ref 0–5)
ERYTHROCYTE [DISTWIDTH] IN BLOOD BY AUTOMATED COUNT: 22.6 % (ref 11.2–14.1)
ETHANOL BLD-MCNC: 0.18 G/DL (ref 0–0.08)
GLUCOSE SERPL-MCNC: 140 MG/DL (ref 70–99)
GLUCOSE UR STRIP-MCNC: NEGATIVE MG/DL
HCT VFR BLD AUTO: 46.9 % (ref 39–49)
HGB BLD-MCNC: 16.3 G/DL (ref 13.1–16.8)
KETONES UR STRIP-MCNC: NEGATIVE MG/DL
LYMPHOCYTES # BLD AUTO: 1.97 K/UL (ref 0.5–3.5)
LYMPHOCYTES NFR BLD AUTO: 23.3 % (ref 10–50)
MCH RBC QN AUTO: 26.9 PG (ref 28.2–33.3)
MCHC RBC AUTO-ENTMCNC: 34.8 G/DL (ref 31.7–36)
MCHC RBC AUTO-ENTMCNC: 77.3 FL (ref 84–98)
MONOCYTES # BLD AUTO: 0.59 K/UL (ref 0–1)
MONOCYTES NFR BLD AUTO: 7 % (ref 2–14)
NEUTROPHILS # BLD AUTO: 5.76 K/UL (ref 1.4–7)
NEUTROPHILS NFR BLD AUTO: 68.1 % (ref 45–80)
NITRITE UR QL: NEGATIVE
OXYCODONE UR QL SCN: NEGATIVE
PH UR STRIP: 7 [PH] (ref 5–9)
PLATELET # BLD AUTO: 186 K/UL (ref 150–350)
POTASSIUM SERPL-SCNC: 2.8 MMOL/L (ref 3.5–5.1)
PROT SERPL-MCNC: 7.5 G/DL (ref 6.4–8.2)
PROT UR STRIP-MCNC: NEGATIVE MG/DL
RBC # BLD AUTO: 6.07 M/UL (ref 4.33–5.41)
RBC UR QL: NEGATIVE
SODIUM SERPL-SCNC: 139 MMOL/L (ref 136–145)
SP GR UR STRIP: 1.01 (ref 1–1.03)
TCA UR-MCNC: POSITIVE UG/ML
THC UR QL SCN>50 NG/ML: POSITIVE
UROBILINOGEN UR STRIP-ACNC: 0.2 E.U./DL (ref 0.2–1)
WBC # BLD AUTO: 8.5 K/UL (ref 4–10.2)

## 2023-08-26 RX ADMIN — ONDANSETRON PRN MG: 2 INJECTION, SOLUTION INTRAMUSCULAR; INTRAVENOUS at 16:53

## 2023-08-26 RX ADMIN — ONDANSETRON PRN MG: 2 INJECTION, SOLUTION INTRAMUSCULAR; INTRAVENOUS at 13:55

## 2023-08-26 RX ADMIN — OMEPRAZOLE SCH: 20 CAPSULE, DELAYED RELEASE ORAL at 20:04

## 2023-08-27 VITALS — SYSTOLIC BLOOD PRESSURE: 150 MMHG | DIASTOLIC BLOOD PRESSURE: 89 MMHG

## 2023-08-27 VITALS — HEART RATE: 76 BPM

## 2023-08-27 LAB
ALBUMIN SERPL-MCNC: 2.9 G/DL (ref 3.4–5)
ALP SERPL-CCNC: 163 IU/L (ref 46–116)
ALT SERPL-CCNC: 37 U/L (ref 12–78)
ANION GAP SERPL CALC-SCNC: 9.1 MEQ/L (ref 7–15)
AST SERPL-CCNC: 59 U/L (ref 15–37)
BILIRUB SERPL-MCNC: 1.6 MG/DL (ref 0.2–1)
BUN SERPL-MCNC: 3 MG/DL (ref 7–18)
CALCIUM SERPL-MCNC: 7.4 MG/DL (ref 8.5–10.1)
CHLORIDE SERPL-SCNC: 102 MMOL/L (ref 98–107)
CO2 SERPL-SCNC: 25.7 MMOL/L (ref 21–32)
CREAT CL 24H UR+SERPL-VRATE: 93.84 ML/MIN
CREAT SERPL-MCNC: 0.82 MG/DL (ref 0.51–1.17)
EGFRCR SERPLBLD CKD-EPI 2021: 101 ML/MIN (ref 60–?)
GLUCOSE SERPL-MCNC: 121 MG/DL (ref 70–99)
LACTATE SERPL-SCNC: 0.8 MMOL/L (ref 0.4–2)
LIPASE SERPL-CCNC: 20 U/L (ref 16–77)
MAGNESIUM SERPL-MCNC: 1.4 MG/DL (ref 1.8–2.4)
POTASSIUM SERPL-SCNC: 2.8 MMOL/L (ref 3.5–5.1)
PROT SERPL-MCNC: 6 G/DL (ref 6.4–8.2)
SODIUM SERPL-SCNC: 134 MMOL/L (ref 136–145)

## 2023-08-27 RX ADMIN — POTASSIUM CHLORIDE SCH MLS/HR: 200 INJECTION, SOLUTION INTRAVENOUS at 12:09

## 2023-08-27 RX ADMIN — OMEPRAZOLE SCH MG: 20 CAPSULE, DELAYED RELEASE ORAL at 09:25

## 2023-08-27 RX ADMIN — POTASSIUM CHLORIDE SCH MLS/HR: 200 INJECTION, SOLUTION INTRAVENOUS at 11:36

## 2023-08-27 RX ADMIN — POTASSIUM CHLORIDE SCH MLS/HR: 200 INJECTION, SOLUTION INTRAVENOUS at 10:31

## 2023-09-01 ENCOUNTER — HOSPITAL ENCOUNTER (EMERGENCY)
Dept: HOSPITAL 52 - LL.ED | Age: 58
Discharge: NURSING, REHAB OR LONG-TERM CARE | End: 2023-09-01
Payer: MEDICARE

## 2023-09-01 VITALS — HEART RATE: 96 BPM | DIASTOLIC BLOOD PRESSURE: 86 MMHG | SYSTOLIC BLOOD PRESSURE: 133 MMHG

## 2023-09-01 DIAGNOSIS — A04.72: Primary | ICD-10-CM

## 2023-09-01 DIAGNOSIS — Z72.0: ICD-10-CM

## 2023-09-01 DIAGNOSIS — E03.9: ICD-10-CM

## 2023-09-01 DIAGNOSIS — J44.9: ICD-10-CM

## 2023-09-01 DIAGNOSIS — I10: ICD-10-CM

## 2023-09-01 DIAGNOSIS — Z79.899: ICD-10-CM

## 2023-09-01 DIAGNOSIS — E78.00: ICD-10-CM

## 2023-09-01 LAB
ALBUMIN SERPL-MCNC: 3.5 G/DL (ref 3.4–5)
ALP SERPL-CCNC: 144 IU/L (ref 46–116)
ALT SERPL-CCNC: 52 U/L (ref 12–78)
AMPHET UR QL SCN: NEGATIVE
AMPHET UR QL SCN: NEGATIVE
ANION GAP SERPL CALC-SCNC: 17.1 MEQ/L (ref 7–15)
APPEARANCE UR: CLEAR
AST SERPL-CCNC: 65 U/L (ref 15–37)
BARBITURATES UR QL SCN: NEGATIVE
BASOPHILS # BLD AUTO: 0.03 K/UL (ref 0–0.2)
BASOPHILS NFR BLD AUTO: 0.2 % (ref 0–2)
BENZODIAZ UR QL SCN: NEGATIVE
BILIRUB SERPL-MCNC: 0.3 MG/DL (ref 0.2–1)
BILIRUB UR STRIP-MCNC: NEGATIVE MG/DL
BUN SERPL-MCNC: 8 MG/DL (ref 7–18)
BUPRENORPHINE UR QL: NEGATIVE
CALCIUM SERPL-MCNC: 10.2 MG/DL (ref 8.5–10.1)
CHLORIDE SERPL-SCNC: 107 MMOL/L (ref 98–107)
CO2 SERPL-SCNC: 20.9 MMOL/L (ref 21–32)
COCAINE UR QL SCN: NEGATIVE
COLOR UR: YELLOW
CREAT CL 24H UR+SERPL-VRATE: (no result) ML/MIN
CREAT SERPL-MCNC: 1.06 MG/DL (ref 0.51–1.17)
EDDP UR QL: NEGATIVE
EGFRCR SERPLBLD CKD-EPI 2021: 81 ML/MIN (ref 60–?)
EOSINOPHIL # BLD AUTO: 0.19 K/UL (ref 0–0.5)
EOSINOPHIL NFR BLD AUTO: 1.3 % (ref 0–5)
ERYTHROCYTE [DISTWIDTH] IN BLOOD BY AUTOMATED COUNT: 22.6 % (ref 11.2–14.1)
ETHANOL BLD-MCNC: 0.25 G/DL (ref 0–0.08)
GLUCOSE SERPL-MCNC: 137 MG/DL (ref 70–99)
GLUCOSE UR STRIP-MCNC: NEGATIVE MG/DL
HCT VFR BLD AUTO: 40.4 % (ref 39–49)
HGB BLD-MCNC: 13.5 G/DL (ref 13.1–16.8)
INR PPP: 1
KETONES UR STRIP-MCNC: NEGATIVE MG/DL
LACTATE SERPL-SCNC: 3.9 MMOL/L (ref 0.4–2)
LYMPHOCYTES # BLD AUTO: 1.62 K/UL (ref 0.5–3.5)
LYMPHOCYTES NFR BLD AUTO: 11.5 % (ref 10–50)
MAGNESIUM SERPL-MCNC: 2 MG/DL (ref 1.8–2.4)
MCH RBC QN AUTO: 28.8 PG (ref 28.2–33.3)
MCHC RBC AUTO-ENTMCNC: 33.4 G/DL (ref 31.7–36)
MCHC RBC AUTO-ENTMCNC: 86.3 FL (ref 84–98)
MONOCYTES # BLD AUTO: 1.22 K/UL (ref 0–1)
MONOCYTES NFR BLD AUTO: 8.7 % (ref 2–14)
NEUTROPHILS # BLD AUTO: 11.02 K/UL (ref 1.4–7)
NEUTROPHILS NFR BLD AUTO: 78.3 % (ref 45–80)
NITRITE UR QL: NEGATIVE
OXYCODONE UR QL SCN: NEGATIVE
PH UR STRIP: 6.5 [PH] (ref 5–9)
PHOSPHATE SERPL-MCNC: 3.9 MG/DL (ref 2.6–4.7)
PLATELET # BLD AUTO: 138 K/UL (ref 150–350)
POTASSIUM SERPL-SCNC: 4 MMOL/L (ref 3.5–5.1)
PROT SERPL-MCNC: 7.5 G/DL (ref 6.4–8.2)
PROT UR STRIP-MCNC: NEGATIVE MG/DL
PROTHROMBIN TIME: 9.7 SEC (ref 9–11.1)
RBC # BLD AUTO: 4.68 M/UL (ref 4.33–5.41)
RBC UR QL: NEGATIVE
SODIUM SERPL-SCNC: 141 MMOL/L (ref 136–145)
SP GR UR STRIP: <= 1.005 (ref 1–1.03)
TCA UR-MCNC: NEGATIVE UG/ML
THC UR QL SCN>50 NG/ML: NEGATIVE
UROBILINOGEN UR STRIP-ACNC: 0.2 E.U./DL (ref 0.2–1)
WBC # BLD AUTO: 14.1 K/UL (ref 4–10.2)

## 2023-09-01 RX ADMIN — LORAZEPAM ONE: 2 INJECTION INTRAMUSCULAR; INTRAVENOUS at 18:52

## 2023-09-01 RX ADMIN — LORAZEPAM ONE MG: 2 INJECTION INTRAMUSCULAR; INTRAVENOUS at 19:58

## 2023-09-01 RX ADMIN — LORAZEPAM ONE MG: 2 INJECTION INTRAMUSCULAR; INTRAVENOUS at 18:50

## 2023-09-05 ENCOUNTER — HOSPITAL ENCOUNTER (EMERGENCY)
Dept: HOSPITAL 52 - LL.ED | Age: 58
Discharge: LEFT BEFORE DISCHARGE | End: 2023-09-05
Payer: MEDICARE

## 2023-09-05 VITALS — DIASTOLIC BLOOD PRESSURE: 94 MMHG | HEART RATE: 100 BPM | SYSTOLIC BLOOD PRESSURE: 139 MMHG

## 2023-09-05 DIAGNOSIS — A04.72: Primary | ICD-10-CM

## 2023-09-05 DIAGNOSIS — F10.10: ICD-10-CM
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
## 2023-09-05 DIAGNOSIS — R74.02: ICD-10-CM

## 2023-09-05 LAB
ALBUMIN SERPL-MCNC: 3.7 G/DL (ref 3.4–5)
ALP SERPL-CCNC: 126 IU/L (ref 46–116)
ALT SERPL-CCNC: 66 U/L (ref 12–78)
AMPHET UR QL SCN: NEGATIVE
AMPHET UR QL SCN: NEGATIVE
ANION GAP SERPL CALC-SCNC: 13.5 MEQ/L (ref 7–15)
APPEARANCE UR: CLEAR
AST SERPL-CCNC: 57 U/L (ref 15–37)
BARBITURATES UR QL SCN: NEGATIVE
BASOPHILS # BLD AUTO: 0.04 K/UL (ref 0–0.2)
BASOPHILS NFR BLD AUTO: 0.5 % (ref 0–2)
BENZODIAZ UR QL SCN: NEGATIVE
BILIRUB SERPL-MCNC: 0.3 MG/DL (ref 0.2–1)
BILIRUB UR STRIP-MCNC: NEGATIVE MG/DL
BUN SERPL-MCNC: 6 MG/DL (ref 7–18)
BUPRENORPHINE UR QL: NEGATIVE
CALCIUM SERPL-MCNC: 8.7 MG/DL (ref 8.5–10.1)
CHLORIDE SERPL-SCNC: 105 MMOL/L (ref 98–107)
CO2 SERPL-SCNC: 23.5 MMOL/L (ref 21–32)
COCAINE UR QL SCN: NEGATIVE
COLOR UR: YELLOW
CREAT CL 24H UR+SERPL-VRATE: (no result) ML/MIN
CREAT SERPL-MCNC: 0.96 MG/DL (ref 0.51–1.17)
EDDP UR QL: NEGATIVE
EGFRCR SERPLBLD CKD-EPI 2021: 91 ML/MIN (ref 60–?)
EOSINOPHIL # BLD AUTO: 0.1 K/UL (ref 0–0.5)
EOSINOPHIL NFR BLD AUTO: 1.2 % (ref 0–5)
ERYTHROCYTE [DISTWIDTH] IN BLOOD BY AUTOMATED COUNT: 22.8 % (ref 11.2–14.1)
ETHANOL BLD-MCNC: 0.25 G/DL (ref 0–0.08)
GLUCOSE SERPL-MCNC: 117 MG/DL (ref 70–99)
GLUCOSE UR STRIP-MCNC: NEGATIVE MG/DL
HCT VFR BLD AUTO: 44.1 % (ref 39–49)
HGB BLD-MCNC: 14.5 G/DL (ref 13.1–16.8)
KETONES UR STRIP-MCNC: NEGATIVE MG/DL
LYMPHOCYTES # BLD AUTO: 2.4 K/UL (ref 0.5–3.5)
LYMPHOCYTES NFR BLD AUTO: 28.5 % (ref 10–50)
MCH RBC QN AUTO: 28.6 PG (ref 28.2–33.3)
MCHC RBC AUTO-ENTMCNC: 32.9 G/DL (ref 31.7–36)
MCHC RBC AUTO-ENTMCNC: 87 FL (ref 84–98)
MONOCYTES # BLD AUTO: 0.63 K/UL (ref 0–1)
MONOCYTES NFR BLD AUTO: 7.5 % (ref 2–14)
NEUTROPHILS # BLD AUTO: 5.25 K/UL (ref 1.4–7)
NEUTROPHILS NFR BLD AUTO: 62.3 % (ref 45–80)
NITRITE UR QL: NEGATIVE
OXYCODONE UR QL SCN: POSITIVE
PH UR STRIP: 6 [PH] (ref 5–9)
PLATELET # BLD AUTO: 265 K/UL (ref 150–350)
POTASSIUM SERPL-SCNC: 3.8 MMOL/L (ref 3.5–5.1)
PROT SERPL-MCNC: 8 G/DL (ref 6.4–8.2)
PROT UR STRIP-MCNC: NEGATIVE MG/DL
RBC # BLD AUTO: 5.07 M/UL (ref 4.33–5.41)
RBC UR QL: NEGATIVE
SODIUM SERPL-SCNC: 142 MMOL/L (ref 136–145)
SP GR UR STRIP: 1.01 (ref 1–1.03)
TCA UR-MCNC: NEGATIVE UG/ML
THC UR QL SCN>50 NG/ML: POSITIVE
UROBILINOGEN UR STRIP-ACNC: 0.2 E.U./DL (ref 0.2–1)
WBC # BLD AUTO: 8.4 K/UL (ref 4–10.2)

## 2023-09-08 ENCOUNTER — HOSPITAL ENCOUNTER (EMERGENCY)
Dept: HOSPITAL 52 - LL.ED | Age: 58
Discharge: HOME | End: 2023-09-08
Payer: MEDICARE

## 2023-09-08 VITALS — SYSTOLIC BLOOD PRESSURE: 144 MMHG | DIASTOLIC BLOOD PRESSURE: 90 MMHG | HEART RATE: 89 BPM

## 2023-09-08 DIAGNOSIS — K21.9: ICD-10-CM

## 2023-09-08 DIAGNOSIS — E03.9: ICD-10-CM

## 2023-09-08 DIAGNOSIS — E78.00: ICD-10-CM

## 2023-09-08 DIAGNOSIS — I48.91: ICD-10-CM

## 2023-09-08 DIAGNOSIS — J44.9: ICD-10-CM

## 2023-09-08 DIAGNOSIS — Z79.899: ICD-10-CM

## 2023-09-08 DIAGNOSIS — I10: ICD-10-CM

## 2023-09-08 DIAGNOSIS — F17.210: ICD-10-CM

## 2023-09-08 DIAGNOSIS — F10.10: Primary | ICD-10-CM

## 2023-09-08 PROCEDURE — 99283 EMERGENCY DEPT VISIT LOW MDM: CPT

## 2023-09-08 RX ADMIN — ONDANSETRON ONE MG: 4 TABLET, ORALLY DISINTEGRATING ORAL at 03:09

## 2023-09-09 ENCOUNTER — HOSPITAL ENCOUNTER (EMERGENCY)
Dept: HOSPITAL 52 - LL.ED | Age: 58
Discharge: HOME | End: 2023-09-09
Payer: MEDICARE

## 2023-09-09 VITALS — SYSTOLIC BLOOD PRESSURE: 146 MMHG | DIASTOLIC BLOOD PRESSURE: 78 MMHG

## 2023-09-09 VITALS — HEART RATE: 91 BPM

## 2023-09-09 DIAGNOSIS — I48.91: ICD-10-CM

## 2023-09-09 DIAGNOSIS — J44.9: ICD-10-CM

## 2023-09-09 DIAGNOSIS — I10: ICD-10-CM

## 2023-09-09 DIAGNOSIS — Z79.899: ICD-10-CM

## 2023-09-09 DIAGNOSIS — E11.9: ICD-10-CM

## 2023-09-09 DIAGNOSIS — E78.00: ICD-10-CM

## 2023-09-09 DIAGNOSIS — Z72.0: ICD-10-CM

## 2023-09-09 DIAGNOSIS — F10.10: Primary | ICD-10-CM

## 2023-09-15 ENCOUNTER — HOSPITAL ENCOUNTER (EMERGENCY)
Dept: HOSPITAL 52 - LL.ED | Age: 58
Discharge: HOME | End: 2023-09-15
Payer: MEDICARE

## 2023-09-15 VITALS — HEART RATE: 92 BPM | DIASTOLIC BLOOD PRESSURE: 81 MMHG | SYSTOLIC BLOOD PRESSURE: 187 MMHG

## 2023-09-15 DIAGNOSIS — J44.9: ICD-10-CM

## 2023-09-15 DIAGNOSIS — R11.0: ICD-10-CM

## 2023-09-15 DIAGNOSIS — D64.9: ICD-10-CM

## 2023-09-15 DIAGNOSIS — E03.9: ICD-10-CM

## 2023-09-15 DIAGNOSIS — Z79.899: ICD-10-CM

## 2023-09-15 DIAGNOSIS — E78.00: ICD-10-CM

## 2023-09-15 DIAGNOSIS — F10.120: Primary | ICD-10-CM

## 2023-09-15 DIAGNOSIS — I10: ICD-10-CM

## 2023-09-15 DIAGNOSIS — I48.91: ICD-10-CM

## 2023-09-15 DIAGNOSIS — K21.9: ICD-10-CM

## 2023-09-15 PROCEDURE — 99284 EMERGENCY DEPT VISIT MOD MDM: CPT

## 2023-09-15 RX ADMIN — ONDANSETRON ONE PACKET: 4 TABLET, ORALLY DISINTEGRATING ORAL at 19:09

## 2023-09-18 ENCOUNTER — HOSPITAL ENCOUNTER (EMERGENCY)
Dept: HOSPITAL 52 - LL.ED | Age: 58
Discharge: HOME | End: 2023-09-18
Payer: MEDICARE

## 2023-09-18 VITALS — DIASTOLIC BLOOD PRESSURE: 102 MMHG | HEART RATE: 99 BPM | SYSTOLIC BLOOD PRESSURE: 145 MMHG

## 2023-09-18 DIAGNOSIS — J44.9: ICD-10-CM

## 2023-09-18 DIAGNOSIS — E03.9: ICD-10-CM

## 2023-09-18 DIAGNOSIS — E78.00: ICD-10-CM

## 2023-09-18 DIAGNOSIS — E87.6: ICD-10-CM

## 2023-09-18 DIAGNOSIS — Z79.899: ICD-10-CM

## 2023-09-18 DIAGNOSIS — I10: ICD-10-CM

## 2023-09-18 DIAGNOSIS — I48.91: ICD-10-CM

## 2023-09-18 DIAGNOSIS — F10.120: Primary | ICD-10-CM

## 2023-09-18 DIAGNOSIS — K21.9: ICD-10-CM

## 2023-09-18 LAB
ALBUMIN SERPL-MCNC: 3.8 G/DL (ref 3.4–5)
ALP SERPL-CCNC: 163 IU/L (ref 46–116)
ALT SERPL-CCNC: 145 U/L (ref 12–78)
AMPHET UR QL SCN: NEGATIVE
AMPHET UR QL SCN: NEGATIVE
ANION GAP SERPL CALC-SCNC: 18.7 MEQ/L (ref 7–15)
AST SERPL-CCNC: 144 U/L (ref 15–37)
BARBITURATES UR QL SCN: NEGATIVE
BASOPHILS # BLD AUTO: 0.05 K/UL (ref 0–0.2)
BASOPHILS NFR BLD AUTO: 0.5 % (ref 0–2)
BENZODIAZ UR QL SCN: NEGATIVE
BILIRUB SERPL-MCNC: 0.2 MG/DL (ref 0.2–1)
BUN SERPL-MCNC: 6 MG/DL (ref 7–18)
BUPRENORPHINE UR QL: NEGATIVE
CALCIUM SERPL-MCNC: 8.4 MG/DL (ref 8.5–10.1)
CHLORIDE SERPL-SCNC: 101 MMOL/L (ref 98–107)
CO2 SERPL-SCNC: 22.2 MMOL/L (ref 21–32)
COCAINE UR QL SCN: NEGATIVE
CREAT CL 24H UR+SERPL-VRATE: 117.97 ML/MIN
CREAT SERPL-MCNC: 0.74 MG/DL (ref 0.51–1.17)
EDDP UR QL: NEGATIVE
EGFRCR SERPLBLD CKD-EPI 2021: 104 ML/MIN (ref 60–?)
EOSINOPHIL # BLD AUTO: 0.03 K/UL (ref 0–0.5)
EOSINOPHIL NFR BLD AUTO: 0.3 % (ref 0–5)
ERYTHROCYTE [DISTWIDTH] IN BLOOD BY AUTOMATED COUNT: 19.8 % (ref 11.2–14.1)
ETHANOL BLD-MCNC: 0.39 G/DL (ref 0–0.08)
GLUCOSE SERPL-MCNC: 154 MG/DL (ref 70–99)
HCT VFR BLD AUTO: 47.9 % (ref 39–49)
HGB BLD-MCNC: 16.8 G/DL (ref 13.1–16.8)
INR PPP: 1
LYMPHOCYTES # BLD AUTO: 3.26 K/UL (ref 0.5–3.5)
LYMPHOCYTES NFR BLD AUTO: 30.9 % (ref 10–50)
MCH RBC QN AUTO: 29.5 PG (ref 28.2–33.3)
MCHC RBC AUTO-ENTMCNC: 35.1 G/DL (ref 31.7–36)
MCHC RBC AUTO-ENTMCNC: 84 FL (ref 84–98)
MONOCYTES # BLD AUTO: 1.24 K/UL (ref 0–1)
MONOCYTES NFR BLD AUTO: 11.8 % (ref 2–14)
NEUTROPHILS # BLD AUTO: 5.96 K/UL (ref 1.4–7)
NEUTROPHILS NFR BLD AUTO: 56.5 % (ref 45–80)
OXYCODONE UR QL SCN: NEGATIVE
PLATELET # BLD AUTO: 258 K/UL (ref 150–350)
POTASSIUM SERPL-SCNC: 2.9 MMOL/L (ref 3.5–5.1)
PROT SERPL-MCNC: 8.1 G/DL (ref 6.4–8.2)
RBC # BLD AUTO: 5.7 M/UL (ref 4.33–5.41)
SODIUM SERPL-SCNC: 139 MMOL/L (ref 136–145)
TCA UR-MCNC: NEGATIVE UG/ML
THC UR QL SCN>50 NG/ML: NEGATIVE
WBC # BLD AUTO: 10.5 K/UL (ref 4–10.2)

## 2023-12-13 ENCOUNTER — HOSPITAL ENCOUNTER (EMERGENCY)
Dept: HOSPITAL 52 - LL.ED | Age: 58
Discharge: HOME | End: 2023-12-13
Payer: MEDICARE

## 2023-12-13 VITALS — SYSTOLIC BLOOD PRESSURE: 127 MMHG | DIASTOLIC BLOOD PRESSURE: 100 MMHG | HEART RATE: 92 BPM

## 2023-12-13 DIAGNOSIS — E78.00: ICD-10-CM

## 2023-12-13 DIAGNOSIS — Z79.899: ICD-10-CM

## 2023-12-13 DIAGNOSIS — J44.9: ICD-10-CM

## 2023-12-13 DIAGNOSIS — I10: ICD-10-CM

## 2023-12-13 DIAGNOSIS — Z20.822: ICD-10-CM

## 2023-12-13 DIAGNOSIS — K21.9: ICD-10-CM

## 2023-12-13 DIAGNOSIS — J10.1: Primary | ICD-10-CM

## 2023-12-13 DIAGNOSIS — E03.9: ICD-10-CM

## 2023-12-13 LAB
ALBUMIN SERPL-MCNC: 3.4 G/DL (ref 3.4–5)
ALP SERPL-CCNC: 145 IU/L (ref 46–116)
ALT SERPL-CCNC: 49 U/L (ref 12–78)
ANION GAP SERPL CALC-SCNC: 11.9 MEQ/L (ref 7–15)
AST SERPL-CCNC: 33 U/L (ref 15–37)
BASOPHILS # BLD AUTO: 0.02 K/UL (ref 0–0.2)
BASOPHILS NFR BLD AUTO: 0.4 % (ref 0–2)
BILIRUB SERPL-MCNC: 0.6 MG/DL (ref 0.2–1)
BUN SERPL-MCNC: 9 MG/DL (ref 7–18)
CALCIUM SERPL-MCNC: 8.8 MG/DL (ref 8.5–10.1)
CHLORIDE SERPL-SCNC: 102 MMOL/L (ref 98–107)
CO2 SERPL-SCNC: 25.1 MMOL/L (ref 21–32)
CREAT CL 24H UR+SERPL-VRATE: (no result) ML/MIN
CREAT SERPL-MCNC: 0.94 MG/DL (ref 0.51–1.17)
EGFRCR SERPLBLD CKD-EPI 2021: 93 ML/MIN (ref 60–?)
EOSINOPHIL # BLD AUTO: 0.04 K/UL (ref 0–0.5)
EOSINOPHIL NFR BLD AUTO: 0.8 % (ref 0–5)
ERYTHROCYTE [DISTWIDTH] IN BLOOD BY AUTOMATED COUNT: 19.3 % (ref 11.2–14.1)
FLUAV RNA UPPER RESP QL NAA+PROBE: POSITIVE
FLUBV RNA UPPER RESP QL NAA+PROBE: NEGATIVE
GLUCOSE SERPL-MCNC: 113 MG/DL (ref 70–99)
HCT VFR BLD AUTO: 44.3 % (ref 39–49)
HGB BLD-MCNC: 14.4 G/DL (ref 13.1–16.8)
LYMPHOCYTES # BLD AUTO: 0.98 K/UL (ref 0.5–3.5)
LYMPHOCYTES NFR BLD AUTO: 20.1 % (ref 10–50)
MAGNESIUM SERPL-MCNC: 2.1 MG/DL (ref 1.8–2.4)
MCH RBC QN AUTO: 26.4 PG (ref 28.2–33.3)
MCHC RBC AUTO-ENTMCNC: 32.5 G/DL (ref 31.7–36)
MCHC RBC AUTO-ENTMCNC: 81.1 FL (ref 84–98)
MONOCYTES # BLD AUTO: 0.49 K/UL (ref 0–1)
MONOCYTES NFR BLD AUTO: 10.1 % (ref 2–14)
NEUTROPHILS # BLD AUTO: 3.34 K/UL (ref 1.4–7)
NEUTROPHILS NFR BLD AUTO: 68.6 % (ref 45–80)
PLATELET # BLD AUTO: 211 K/UL (ref 150–350)
POTASSIUM SERPL-SCNC: 3.5 MMOL/L (ref 3.5–5.1)
PROT SERPL-MCNC: 7.7 G/DL (ref 6.4–8.2)
RBC # BLD AUTO: 5.46 M/UL (ref 4.33–5.41)
RSV RNA UPPER RESP QL NAA+PROBE: NEGATIVE
SARS-COV-2 RNA RESP QL NAA+PROBE: NEGATIVE
SODIUM SERPL-SCNC: 139 MMOL/L (ref 136–145)
WBC # BLD AUTO: 4.9 K/UL (ref 4–10.2)

## 2023-12-19 ENCOUNTER — HOSPITAL ENCOUNTER (EMERGENCY)
Dept: HOSPITAL 52 - LL.ED | Age: 58
Discharge: HOME | End: 2023-12-19
Payer: MEDICARE

## 2023-12-19 VITALS — DIASTOLIC BLOOD PRESSURE: 99 MMHG | SYSTOLIC BLOOD PRESSURE: 166 MMHG | HEART RATE: 80 BPM

## 2023-12-19 DIAGNOSIS — B34.9: ICD-10-CM

## 2023-12-19 DIAGNOSIS — K21.9: ICD-10-CM

## 2023-12-19 DIAGNOSIS — F17.210: ICD-10-CM

## 2023-12-19 DIAGNOSIS — I10: ICD-10-CM

## 2023-12-19 DIAGNOSIS — E78.00: ICD-10-CM

## 2023-12-19 DIAGNOSIS — Z90.49: ICD-10-CM

## 2023-12-19 DIAGNOSIS — F41.9: Primary | ICD-10-CM

## 2023-12-19 DIAGNOSIS — Z79.899: ICD-10-CM

## 2023-12-19 DIAGNOSIS — T36.95XA: ICD-10-CM

## 2023-12-19 DIAGNOSIS — Z88.8: ICD-10-CM

## 2023-12-19 LAB
ALBUMIN SERPL-MCNC: 3.5 G/DL (ref 3.4–5)
ALP SERPL-CCNC: 133 IU/L (ref 46–116)
ALT SERPL-CCNC: 46 U/L (ref 12–78)
ANION GAP SERPL CALC-SCNC: 11.1 MEQ/L (ref 7–15)
AST SERPL-CCNC: 24 U/L (ref 15–37)
BASOPHILS # BLD AUTO: 0.01 K/UL (ref 0–0.2)
BASOPHILS NFR BLD AUTO: 0.1 % (ref 0–2)
BILIRUB SERPL-MCNC: 0.5 MG/DL (ref 0.2–1)
BUN SERPL-MCNC: 11 MG/DL (ref 7–18)
CALCIUM SERPL-MCNC: 8.8 MG/DL (ref 8.5–10.1)
CHLORIDE SERPL-SCNC: 106 MMOL/L (ref 98–107)
CO2 SERPL-SCNC: 24.9 MMOL/L (ref 21–32)
CREAT CL 24H UR+SERPL-VRATE: 90.53 ML/MIN
CREAT SERPL-MCNC: 0.85 MG/DL (ref 0.51–1.17)
EGFRCR SERPLBLD CKD-EPI 2021: 100 ML/MIN (ref 60–?)
EOSINOPHIL # BLD AUTO: 0.01 K/UL (ref 0–0.5)
EOSINOPHIL NFR BLD AUTO: 0.1 % (ref 0–5)
ERYTHROCYTE [DISTWIDTH] IN BLOOD BY AUTOMATED COUNT: 19.3 % (ref 11.2–14.1)
GLUCOSE SERPL-MCNC: 125 MG/DL (ref 70–99)
HCT VFR BLD AUTO: 42.9 % (ref 39–49)
HGB BLD-MCNC: 14.1 G/DL (ref 13.1–16.8)
INR PPP: 1 (ref 0.9–1.1)
LYMPHOCYTES # BLD AUTO: 1.1 K/UL (ref 0.5–3.5)
LYMPHOCYTES NFR BLD AUTO: 12.7 % (ref 10–50)
MCH RBC QN AUTO: 26.6 PG (ref 28.2–33.3)
MCHC RBC AUTO-ENTMCNC: 32.9 G/DL (ref 31.7–36)
MCHC RBC AUTO-ENTMCNC: 80.8 FL (ref 84–98)
MONOCYTES # BLD AUTO: 0.34 K/UL (ref 0–1)
MONOCYTES NFR BLD AUTO: 3.9 % (ref 2–14)
NEUTROPHILS # BLD AUTO: 7.21 K/UL (ref 1.4–7)
NEUTROPHILS NFR BLD AUTO: 83.2 % (ref 45–80)
PLATELET # BLD AUTO: 285 K/UL (ref 150–350)
POTASSIUM SERPL-SCNC: 3.7 MMOL/L (ref 3.5–5.1)
PROT SERPL-MCNC: 7.6 G/DL (ref 6.4–8.2)
PROTHROMBIN TIME: 10.4 SEC (ref 9–11.1)
RBC # BLD AUTO: 5.31 M/UL (ref 4.33–5.41)
SODIUM SERPL-SCNC: 142 MMOL/L (ref 136–145)
WBC # BLD AUTO: 8.7 K/UL (ref 4–10.2)

## 2023-12-19 RX ADMIN — ONDANSETRON ONE MG: 2 INJECTION, SOLUTION INTRAMUSCULAR; INTRAVENOUS at 08:19

## 2023-12-19 RX ADMIN — PROCHLORPERAZINE EDISYLATE ONE MG: 5 INJECTION INTRAMUSCULAR; INTRAVENOUS at 10:01

## 2023-12-19 RX ADMIN — Medication PRN ML: at 10:01

## 2023-12-19 RX ADMIN — ONDANSETRON ONE MG: 4 TABLET, ORALLY DISINTEGRATING ORAL at 05:54

## 2023-12-20 ENCOUNTER — HOSPITAL ENCOUNTER (EMERGENCY)
Dept: HOSPITAL 52 - LL.ED | Age: 58
Discharge: HOME | End: 2023-12-20
Payer: MEDICARE

## 2023-12-20 VITALS — HEART RATE: 79 BPM | DIASTOLIC BLOOD PRESSURE: 81 MMHG | SYSTOLIC BLOOD PRESSURE: 121 MMHG

## 2023-12-20 DIAGNOSIS — Z79.899: ICD-10-CM

## 2023-12-20 DIAGNOSIS — Z88.8: ICD-10-CM

## 2023-12-20 DIAGNOSIS — E87.5: ICD-10-CM

## 2023-12-20 DIAGNOSIS — T36.8X5A: ICD-10-CM

## 2023-12-20 DIAGNOSIS — Z90.49: ICD-10-CM

## 2023-12-20 DIAGNOSIS — J44.9: ICD-10-CM

## 2023-12-20 DIAGNOSIS — R10.9: Primary | ICD-10-CM

## 2023-12-20 DIAGNOSIS — E78.00: ICD-10-CM

## 2023-12-20 DIAGNOSIS — E03.9: ICD-10-CM

## 2023-12-20 DIAGNOSIS — R11.2: ICD-10-CM

## 2023-12-20 DIAGNOSIS — I10: ICD-10-CM

## 2023-12-20 LAB
ALBUMIN SERPL-MCNC: 3.4 G/DL (ref 3.4–5)
ALP SERPL-CCNC: 125 IU/L (ref 46–116)
ALT SERPL-CCNC: 46 U/L (ref 12–78)
ANION GAP SERPL CALC-SCNC: 12.6 MEQ/L (ref 7–15)
AST SERPL-CCNC: 61 U/L (ref 15–37)
BASOPHILS # BLD AUTO: 0.02 K/UL (ref 0–0.2)
BASOPHILS NFR BLD AUTO: 0.2 % (ref 0–2)
BILIRUB SERPL-MCNC: 0.6 MG/DL (ref 0.2–1)
BNP SERPL-MCNC: 121 PG/ML (ref 0–125)
BUN SERPL-MCNC: 14 MG/DL (ref 7–18)
CALCIUM SERPL-MCNC: 9 MG/DL (ref 8.5–10.1)
CHLORIDE SERPL-SCNC: 105 MMOL/L (ref 98–107)
CO2 SERPL-SCNC: 25.8 MMOL/L (ref 21–32)
CREAT CL 24H UR+SERPL-VRATE: (no result) ML/MIN
CREAT SERPL-MCNC: 0.82 MG/DL (ref 0.51–1.17)
EGFRCR SERPLBLD CKD-EPI 2021: 101 ML/MIN (ref 60–?)
EOSINOPHIL # BLD AUTO: 0.02 K/UL (ref 0–0.5)
EOSINOPHIL NFR BLD AUTO: 0.2 % (ref 0–5)
ERYTHROCYTE [DISTWIDTH] IN BLOOD BY AUTOMATED COUNT: 19.7 % (ref 11.2–14.1)
ETHANOL BLD-MCNC: < 0 G/DL (ref 0–0.08)
GLUCOSE SERPL-MCNC: 109 MG/DL (ref 70–99)
HCT VFR BLD AUTO: 41.5 % (ref 39–49)
HGB BLD-MCNC: 13.5 G/DL (ref 13.1–16.8)
INR PPP: 1 (ref 0.9–1.1)
LACTATE SERPL-SCNC: 1 MMOL/L (ref 0.4–2)
LYMPHOCYTES # BLD AUTO: 1.64 K/UL (ref 0.5–3.5)
LYMPHOCYTES NFR BLD AUTO: 17.6 % (ref 10–50)
MCH RBC QN AUTO: 26.2 PG (ref 28.2–33.3)
MCHC RBC AUTO-ENTMCNC: 32.5 G/DL (ref 31.7–36)
MCHC RBC AUTO-ENTMCNC: 80.6 FL (ref 84–98)
MONOCYTES # BLD AUTO: 0.5 K/UL (ref 0–1)
MONOCYTES NFR BLD AUTO: 5.4 % (ref 2–14)
NEUTROPHILS # BLD AUTO: 7.12 K/UL (ref 1.4–7)
NEUTROPHILS NFR BLD AUTO: 76.6 % (ref 45–80)
PLATELET # BLD AUTO: 278 K/UL (ref 150–350)
POTASSIUM SERPL-SCNC: 5.4 MMOL/L (ref 3.5–5.1)
PROT SERPL-MCNC: 7.7 G/DL (ref 6.4–8.2)
PROTHROMBIN TIME: 10.4 SEC (ref 9–11.1)
RBC # BLD AUTO: 5.15 M/UL (ref 4.33–5.41)
SODIUM SERPL-SCNC: 138 MMOL/L (ref 136–145)
WBC # BLD AUTO: 9.3 K/UL (ref 4–10.2)

## 2023-12-26 ENCOUNTER — HOSPITAL ENCOUNTER (EMERGENCY)
Dept: HOSPITAL 52 - LL.ED | Age: 58
Discharge: PSYCHIATRIC OR SUBSTANCE USE FACILITY | End: 2023-12-26
Payer: MEDICARE

## 2023-12-26 VITALS — SYSTOLIC BLOOD PRESSURE: 154 MMHG | HEART RATE: 110 BPM | DIASTOLIC BLOOD PRESSURE: 105 MMHG

## 2023-12-26 DIAGNOSIS — Z20.822: ICD-10-CM

## 2023-12-26 DIAGNOSIS — J44.9: ICD-10-CM

## 2023-12-26 DIAGNOSIS — I10: ICD-10-CM

## 2023-12-26 DIAGNOSIS — F32.A: Primary | ICD-10-CM

## 2023-12-26 DIAGNOSIS — E78.00: ICD-10-CM

## 2023-12-26 DIAGNOSIS — Z88.8: ICD-10-CM

## 2023-12-26 DIAGNOSIS — F17.210: ICD-10-CM

## 2023-12-26 DIAGNOSIS — R45.851: ICD-10-CM

## 2023-12-26 DIAGNOSIS — Z79.899: ICD-10-CM

## 2023-12-26 DIAGNOSIS — E03.9: ICD-10-CM

## 2023-12-26 LAB
ALBUMIN SERPL-MCNC: 3.9 G/DL (ref 3.4–5)
ALP SERPL-CCNC: 139 IU/L (ref 46–116)
ALT SERPL-CCNC: 60 U/L (ref 12–78)
AMPHET UR QL SCN: NEGATIVE
AMPHET UR QL SCN: NEGATIVE
ANION GAP SERPL CALC-SCNC: 12.2 MEQ/L (ref 7–15)
APPEARANCE UR: (no result)
AST SERPL-CCNC: 29 U/L (ref 15–37)
BARBITURATES UR QL SCN: NEGATIVE
BASOPHILS # BLD AUTO: 0.03 K/UL (ref 0–0.2)
BASOPHILS NFR BLD AUTO: 0.2 % (ref 0–2)
BENZODIAZ UR QL SCN: NEGATIVE
BILIRUB SERPL-MCNC: 0.7 MG/DL (ref 0.2–1)
BILIRUB UR STRIP-MCNC: NEGATIVE MG/DL
BUN SERPL-MCNC: 16 MG/DL (ref 7–18)
BUPRENORPHINE UR QL: NEGATIVE
CALCIUM SERPL-MCNC: 9.2 MG/DL (ref 8.5–10.1)
CHLORIDE SERPL-SCNC: 104 MMOL/L (ref 98–107)
CO2 SERPL-SCNC: 24.8 MMOL/L (ref 21–32)
COCAINE UR QL SCN: NEGATIVE
COLOR UR: YELLOW
CREAT CL 24H UR+SERPL-VRATE: (no result) ML/MIN
CREAT SERPL-MCNC: 0.95 MG/DL (ref 0.51–1.17)
EDDP UR QL: NEGATIVE
EGFRCR SERPLBLD CKD-EPI 2021: 92 ML/MIN (ref 60–?)
EOSINOPHIL # BLD AUTO: 0.1 K/UL (ref 0–0.5)
EOSINOPHIL NFR BLD AUTO: 0.8 % (ref 0–5)
ERYTHROCYTE [DISTWIDTH] IN BLOOD BY AUTOMATED COUNT: 21.4 % (ref 11.2–14.1)
ETHANOL BLD-MCNC: 0 G/DL (ref 0–0.08)
FLUAV RNA UPPER RESP QL NAA+PROBE: NEGATIVE
FLUBV RNA UPPER RESP QL NAA+PROBE: NEGATIVE
GLUCOSE SERPL-MCNC: 111 MG/DL (ref 70–99)
GLUCOSE UR STRIP-MCNC: NEGATIVE MG/DL
HCT VFR BLD AUTO: 46.5 % (ref 39–49)
HGB BLD-MCNC: 15.1 G/DL (ref 13.1–16.8)
KETONES UR STRIP-MCNC: NEGATIVE MG/DL
LYMPHOCYTES # BLD AUTO: 1.76 K/UL (ref 0.5–3.5)
LYMPHOCYTES NFR BLD AUTO: 13.3 % (ref 10–50)
MCH RBC QN AUTO: 26.2 PG (ref 28.2–33.3)
MCHC RBC AUTO-ENTMCNC: 32.5 G/DL (ref 31.7–36)
MCHC RBC AUTO-ENTMCNC: 80.6 FL (ref 84–98)
MONOCYTES # BLD AUTO: 1.12 K/UL (ref 0–1)
MONOCYTES NFR BLD AUTO: 8.5 % (ref 2–14)
NEUTROPHILS # BLD AUTO: 10.24 K/UL (ref 1.4–7)
NEUTROPHILS NFR BLD AUTO: 77.2 % (ref 45–80)
NITRITE UR QL: NEGATIVE
OXYCODONE UR QL SCN: NEGATIVE
PH UR STRIP: 6 [PH] (ref 5–9)
PLATELET # BLD AUTO: 249 K/UL (ref 150–350)
POTASSIUM SERPL-SCNC: 3.9 MMOL/L (ref 3.5–5.1)
PROT SERPL-MCNC: 7.9 G/DL (ref 6.4–8.2)
PROT UR STRIP-MCNC: NEGATIVE MG/DL
RBC # BLD AUTO: 5.77 M/UL (ref 4.33–5.41)
RBC UR QL: NEGATIVE
RSV RNA UPPER RESP QL NAA+PROBE: NEGATIVE
SARS-COV-2 RNA RESP QL NAA+PROBE: NEGATIVE
SODIUM SERPL-SCNC: 141 MMOL/L (ref 136–145)
SP GR UR STRIP: >= 1.03 (ref 1–1.03)
TCA UR-MCNC: NEGATIVE UG/ML
THC UR QL SCN>50 NG/ML: POSITIVE
UROBILINOGEN UR STRIP-ACNC: 0.2 E.U./DL (ref 0.2–1)
WBC # BLD AUTO: 13.3 K/UL (ref 4–10.2)

## 2024-03-22 ENCOUNTER — HOSPITAL ENCOUNTER (EMERGENCY)
Dept: HOSPITAL 52 - LL.ED | Age: 59
Discharge: NURSING, REHAB OR LONG-TERM CARE | End: 2024-03-22
Payer: MEDICARE

## 2024-03-22 VITALS — HEART RATE: 99 BPM | DIASTOLIC BLOOD PRESSURE: 87 MMHG | SYSTOLIC BLOOD PRESSURE: 142 MMHG

## 2024-03-22 DIAGNOSIS — Z90.49: ICD-10-CM

## 2024-03-22 DIAGNOSIS — F19.10: Primary | ICD-10-CM

## 2024-03-22 DIAGNOSIS — E78.00: ICD-10-CM

## 2024-03-22 DIAGNOSIS — Z88.8: ICD-10-CM

## 2024-03-22 DIAGNOSIS — Z79.899: ICD-10-CM

## 2024-03-22 DIAGNOSIS — K21.9: ICD-10-CM

## 2024-03-22 DIAGNOSIS — E03.9: ICD-10-CM

## 2024-03-22 DIAGNOSIS — I10: ICD-10-CM

## 2024-03-22 DIAGNOSIS — J44.9: ICD-10-CM

## 2024-03-22 LAB
ALBUMIN SERPL-MCNC: 3.2 G/DL (ref 3.4–5)
ALP SERPL-CCNC: 142 IU/L (ref 46–116)
ALT SERPL-CCNC: 70 U/L (ref 12–78)
AMPHET UR QL SCN: NEGATIVE
AMPHET UR QL SCN: NEGATIVE
ANION GAP SERPL CALC-SCNC: 6.1 MEQ/L (ref 7–15)
APAP SERPL-SCNC: < 10 UG/ML (ref 10–30)
APPEARANCE UR: CLEAR
APTT PPP: 24.6 SEC (ref 23.6–29.8)
AST SERPL-CCNC: 44 U/L (ref 15–37)
BARBITURATES UR QL SCN: NEGATIVE
BASOPHILS # BLD AUTO: 0.04 K/UL (ref 0–0.2)
BASOPHILS NFR BLD AUTO: 0.4 % (ref 0–2)
BENZODIAZ UR QL SCN: NEGATIVE
BILIRUB SERPL-MCNC: 0.2 MG/DL (ref 0.2–1)
BILIRUB UR STRIP-MCNC: NEGATIVE MG/DL
BUN SERPL-MCNC: 22 MG/DL (ref 7–18)
BUPRENORPHINE UR QL: NEGATIVE
CALCIUM SERPL-MCNC: 8.8 MG/DL (ref 8.5–10.1)
CHLORIDE SERPL-SCNC: 104 MMOL/L (ref 98–107)
CK SERPL-CCNC: 114 U/L (ref 26–308)
CO2 SERPL-SCNC: 27.9 MMOL/L (ref 21–32)
COCAINE UR QL SCN: NEGATIVE
COLOR UR: YELLOW
CREAT CL 24H UR+SERPL-VRATE: 69.72 ML/MIN
CREAT SERPL-MCNC: 1.09 MG/DL (ref 0.51–1.17)
EDDP UR QL: NEGATIVE
EGFRCR SERPLBLD CKD-EPI 2021: 78 ML/MIN (ref 60–?)
EOSINOPHIL # BLD AUTO: 0.29 K/UL (ref 0–0.5)
EOSINOPHIL NFR BLD AUTO: 3 % (ref 0–5)
ERYTHROCYTE [DISTWIDTH] IN BLOOD BY AUTOMATED COUNT: 23.5 % (ref 11.2–14.1)
GLUCOSE SERPL-MCNC: 109 MG/DL (ref 70–99)
GLUCOSE UR STRIP-MCNC: NEGATIVE MG/DL
HCT VFR BLD AUTO: 38.8 % (ref 39–49)
HGB BLD-MCNC: 12.3 G/DL (ref 13.1–16.8)
INR PPP: 1.1 (ref 0.9–1.1)
KETONES UR STRIP-MCNC: NEGATIVE MG/DL
LACTATE SERPL-SCNC: 1.1 MMOL/L (ref 0.4–2)
LYMPHOCYTES # BLD AUTO: 1.3 K/UL (ref 0.5–3.5)
LYMPHOCYTES NFR BLD AUTO: 13.6 % (ref 10–50)
MCH RBC QN AUTO: 25.3 PG (ref 28.2–33.3)
MCHC RBC AUTO-ENTMCNC: 31.7 G/DL (ref 31.7–36)
MCHC RBC AUTO-ENTMCNC: 79.7 FL (ref 84–98)
MONOCYTES # BLD AUTO: 0.94 K/UL (ref 0–1)
MONOCYTES NFR BLD AUTO: 9.8 % (ref 2–14)
NEUTROPHILS # BLD AUTO: 7 K/UL (ref 1.4–7)
NEUTROPHILS NFR BLD AUTO: 73.2 % (ref 45–80)
NITRITE UR QL: NEGATIVE
OXYCODONE UR QL SCN: NEGATIVE
PH UR STRIP: 7 [PH] (ref 5–9)
PLATELET # BLD AUTO: 282 K/UL (ref 150–350)
POTASSIUM SERPL-SCNC: 4 MMOL/L (ref 3.5–5.1)
PROT SERPL-MCNC: 7.2 G/DL (ref 6.4–8.2)
PROT UR STRIP-MCNC: NEGATIVE MG/DL
PROTHROMBIN TIME: 10.7 SEC (ref 9–11.1)
RBC # BLD AUTO: 4.87 M/UL (ref 4.33–5.41)
RBC UR QL: NEGATIVE
SODIUM SERPL-SCNC: 138 MMOL/L (ref 136–145)
SP GR UR STRIP: 1.01 (ref 1–1.03)
TCA UR-MCNC: NEGATIVE UG/ML
THC UR QL SCN>50 NG/ML: NEGATIVE
UROBILINOGEN UR STRIP-ACNC: 0.2 E.U./DL (ref 0.2–1)
WBC # BLD AUTO: 9.6 K/UL (ref 4–10.2)

## 2024-03-28 ENCOUNTER — HOSPITAL ENCOUNTER (EMERGENCY)
Dept: HOSPITAL 52 - LL.ED | Age: 59
Discharge: HOME | End: 2024-03-28
Payer: MEDICARE

## 2024-03-28 VITALS — HEART RATE: 78 BPM | DIASTOLIC BLOOD PRESSURE: 84 MMHG | SYSTOLIC BLOOD PRESSURE: 119 MMHG

## 2024-03-28 DIAGNOSIS — F19.10: ICD-10-CM

## 2024-03-28 DIAGNOSIS — I10: ICD-10-CM

## 2024-03-28 DIAGNOSIS — Z88.8: ICD-10-CM

## 2024-03-28 DIAGNOSIS — E03.9: ICD-10-CM

## 2024-03-28 DIAGNOSIS — Z90.49: ICD-10-CM

## 2024-03-28 DIAGNOSIS — E78.00: ICD-10-CM

## 2024-03-28 DIAGNOSIS — Z79.899: ICD-10-CM

## 2024-03-28 DIAGNOSIS — J44.9: ICD-10-CM

## 2024-03-28 DIAGNOSIS — E86.0: ICD-10-CM

## 2024-03-28 DIAGNOSIS — R41.82: Primary | ICD-10-CM

## 2024-03-28 LAB
ALBUMIN SERPL-MCNC: 3.4 G/DL (ref 3.4–5)
ALP SERPL-CCNC: 149 IU/L (ref 46–116)
ALT SERPL-CCNC: 52 U/L (ref 12–78)
AMPHET UR QL SCN: NEGATIVE
AMPHET UR QL SCN: NEGATIVE
ANION GAP SERPL CALC-SCNC: 7.5 MEQ/L (ref 7–15)
APAP SERPL-SCNC: 0 UG/ML (ref 10–30)
APPEARANCE UR: CLEAR
AST SERPL-CCNC: 24 U/L (ref 15–37)
BARBITURATES UR QL SCN: NEGATIVE
BASOPHILS # BLD AUTO: 0.05 K/UL (ref 0–0.2)
BASOPHILS NFR BLD AUTO: 0.3 % (ref 0–2)
BENZODIAZ UR QL SCN: NEGATIVE
BILIRUB SERPL-MCNC: 0.4 MG/DL (ref 0.2–1)
BILIRUB UR STRIP-MCNC: NEGATIVE MG/DL
BUN SERPL-MCNC: 22 MG/DL (ref 7–18)
BUPRENORPHINE UR QL: NEGATIVE
CALCIUM SERPL-MCNC: 9.2 MG/DL (ref 8.5–10.1)
CHLORIDE SERPL-SCNC: 102 MMOL/L (ref 98–107)
CO2 SERPL-SCNC: 33.5 MMOL/L (ref 21–32)
COCAINE UR QL SCN: NEGATIVE
COLOR UR: YELLOW
CREAT CL 24H UR+SERPL-VRATE: 61.92 ML/MIN
CREAT SERPL-MCNC: 1.31 MG/DL (ref 0.51–1.17)
EDDP UR QL: NEGATIVE
EGFRCR SERPLBLD CKD-EPI 2021: 62 ML/MIN (ref 60–?)
EOSINOPHIL # BLD AUTO: 0.33 K/UL (ref 0–0.5)
EOSINOPHIL NFR BLD AUTO: 2.3 % (ref 0–5)
ERYTHROCYTE [DISTWIDTH] IN BLOOD BY AUTOMATED COUNT: 22.7 % (ref 11.2–14.1)
ETHANOL BLD-MCNC: 0 G/DL (ref 0–0.08)
GLUCOSE SERPL-MCNC: 103 MG/DL (ref 70–99)
GLUCOSE UR STRIP-MCNC: NEGATIVE MG/DL
HCT VFR BLD AUTO: 40.1 % (ref 39–49)
HGB BLD-MCNC: 12.5 G/DL (ref 13.1–16.8)
KETONES UR STRIP-MCNC: NEGATIVE MG/DL
LIPASE SERPL-CCNC: 27 U/L (ref 16–77)
LYMPHOCYTES # BLD AUTO: 1.42 K/UL (ref 0.5–3.5)
LYMPHOCYTES NFR BLD AUTO: 9.7 % (ref 10–50)
MCH RBC QN AUTO: 25.1 PG (ref 28.2–33.3)
MCHC RBC AUTO-ENTMCNC: 31.2 G/DL (ref 31.7–36)
MCHC RBC AUTO-ENTMCNC: 80.4 FL (ref 84–98)
MONOCYTES # BLD AUTO: 1.23 K/UL (ref 0–1)
MONOCYTES NFR BLD AUTO: 8.4 % (ref 2–14)
NEUTROPHILS # BLD AUTO: 11.54 K/UL (ref 1.4–7)
NEUTROPHILS NFR BLD AUTO: 79.3 % (ref 45–80)
NITRITE UR QL: NEGATIVE
OXYCODONE UR QL SCN: POSITIVE
PH UR STRIP: 6.5 [PH] (ref 5–9)
PLATELET # BLD AUTO: 302 K/UL (ref 150–350)
POTASSIUM SERPL-SCNC: 4 MMOL/L (ref 3.5–5.1)
PROT SERPL-MCNC: 7.8 G/DL (ref 6.4–8.2)
PROT UR STRIP-MCNC: NEGATIVE MG/DL
RBC # BLD AUTO: 4.99 M/UL (ref 4.33–5.41)
RBC UR QL: NEGATIVE
SODIUM SERPL-SCNC: 139 MMOL/L (ref 136–145)
SP GR UR STRIP: 1.01 (ref 1–1.03)
TCA UR-MCNC: NEGATIVE UG/ML
THC UR QL SCN>50 NG/ML: NEGATIVE
UROBILINOGEN UR STRIP-ACNC: 0.2 E.U./DL (ref 0.2–1)
WBC # BLD AUTO: 14.6 K/UL (ref 4–10.2)

## 2024-05-11 ENCOUNTER — HOSPITAL ENCOUNTER (EMERGENCY)
Dept: HOSPITAL 52 - LL.ED | Age: 59
Discharge: LEFT BEFORE DISCHARGE | End: 2024-05-11
Payer: MEDICARE

## 2024-05-11 VITALS — SYSTOLIC BLOOD PRESSURE: 138 MMHG | DIASTOLIC BLOOD PRESSURE: 86 MMHG | HEART RATE: 113 BPM

## 2024-05-11 DIAGNOSIS — I10: ICD-10-CM

## 2024-05-11 DIAGNOSIS — I48.91: ICD-10-CM

## 2024-05-11 DIAGNOSIS — R06.02: Primary | ICD-10-CM

## 2024-05-11 DIAGNOSIS — Z90.49: ICD-10-CM

## 2024-05-11 DIAGNOSIS — E03.9: ICD-10-CM

## 2024-05-11 DIAGNOSIS — R60.1: ICD-10-CM

## 2024-05-11 DIAGNOSIS — Z79.899: ICD-10-CM

## 2024-05-11 DIAGNOSIS — Z88.8: ICD-10-CM

## 2024-05-11 DIAGNOSIS — J44.9: ICD-10-CM

## 2024-05-11 DIAGNOSIS — E78.00: ICD-10-CM

## 2024-05-11 DIAGNOSIS — K21.9: ICD-10-CM

## 2024-05-11 DIAGNOSIS — Z86.19: ICD-10-CM

## 2024-05-11 LAB
ALBUMIN SERPL-MCNC: 2.9 G/DL (ref 3.4–5)
ALP SERPL-CCNC: 180 IU/L (ref 46–116)
ALT SERPL-CCNC: 42 U/L (ref 12–78)
AMPHET UR QL SCN: NEGATIVE
AMPHET UR QL SCN: NEGATIVE
ANION GAP SERPL CALC-SCNC: 11.4 MEQ/L (ref 7–15)
APPEARANCE UR: CLEAR
AST SERPL-CCNC: 37 U/L (ref 15–37)
BARBITURATES UR QL SCN: NEGATIVE
BASOPHILS # BLD AUTO: 0.02 K/UL (ref 0–0.2)
BASOPHILS NFR BLD AUTO: 0.2 % (ref 0–2)
BENZODIAZ UR QL SCN: NEGATIVE
BILIRUB SERPL-MCNC: 0.3 MG/DL (ref 0.2–1)
BILIRUB UR STRIP-MCNC: NEGATIVE MG/DL
BNP SERPL-MCNC: 321 PG/ML (ref 0–125)
BUN SERPL-MCNC: 24 MG/DL (ref 7–18)
BUPRENORPHINE UR QL: NEGATIVE
CALCIUM SERPL-MCNC: 8.6 MG/DL (ref 8.5–10.1)
CHLORIDE SERPL-SCNC: 101 MMOL/L (ref 98–107)
CO2 SERPL-SCNC: 25.8 MMOL/L (ref 21–32)
COCAINE UR QL SCN: NEGATIVE
COLOR UR: YELLOW
CREAT CL 24H UR+SERPL-VRATE: (no result) ML/MIN
CREAT SERPL-MCNC: 0.9 MG/DL (ref 0.51–1.17)
EDDP UR QL: NEGATIVE
EGFRCR SERPLBLD CKD-EPI 2021: 98 ML/MIN (ref 60–?)
EOSINOPHIL # BLD AUTO: 0.08 K/UL (ref 0–0.5)
EOSINOPHIL NFR BLD AUTO: 0.7 % (ref 0–5)
ERYTHROCYTE [DISTWIDTH] IN BLOOD BY AUTOMATED COUNT: 21.7 % (ref 11.2–14.1)
ETHANOL BLD-MCNC: 0 G/DL (ref 0–0.08)
GLUCOSE SERPL-MCNC: 150 MG/DL (ref 70–99)
GLUCOSE UR STRIP-MCNC: NEGATIVE MG/DL
HCT VFR BLD AUTO: 37.9 % (ref 39–49)
HGB BLD-MCNC: 12.1 G/DL (ref 13.1–16.8)
KETONES UR STRIP-MCNC: NEGATIVE MG/DL
LYMPHOCYTES # BLD AUTO: 0.78 K/UL (ref 0.5–3.5)
LYMPHOCYTES NFR BLD AUTO: 6.4 % (ref 10–50)
MAGNESIUM SERPL-MCNC: 2.1 MG/DL (ref 1.8–2.4)
MCH RBC QN AUTO: 24.6 PG (ref 28.2–33.3)
MCHC RBC AUTO-ENTMCNC: 31.9 G/DL (ref 31.7–36)
MCHC RBC AUTO-ENTMCNC: 77.2 FL (ref 84–98)
MONOCYTES # BLD AUTO: 0.11 K/UL (ref 0–1)
MONOCYTES NFR BLD AUTO: 0.9 % (ref 2–14)
NEUTROPHILS # BLD AUTO: 11.22 K/UL (ref 1.4–7)
NEUTROPHILS NFR BLD AUTO: 91.8 % (ref 45–80)
NITRITE UR QL: NEGATIVE
OXYCODONE UR QL SCN: NEGATIVE
PH UR STRIP: 7 [PH] (ref 5–9)
PLATELET # BLD AUTO: 223 K/UL (ref 150–350)
POTASSIUM SERPL-SCNC: 4.2 MMOL/L (ref 3.5–5.1)
PROT SERPL-MCNC: 7.4 G/DL (ref 6.4–8.2)
PROT UR STRIP-MCNC: NEGATIVE MG/DL
RBC # BLD AUTO: 4.91 M/UL (ref 4.33–5.41)
RBC UR QL: NEGATIVE
SODIUM SERPL-SCNC: 134 MMOL/L (ref 136–145)
SP GR UR STRIP: 1.01 (ref 1–1.03)
TCA UR-MCNC: NEGATIVE UG/ML
THC UR QL SCN>50 NG/ML: NEGATIVE
UROBILINOGEN UR STRIP-ACNC: 0.2 E.U./DL (ref 0.2–1)
WBC # BLD AUTO: 12.2 K/UL (ref 4–10.2)

## 2024-05-11 PROCEDURE — 36415 COLL VENOUS BLD VENIPUNCTURE: CPT

## 2024-05-11 PROCEDURE — 83735 ASSAY OF MAGNESIUM: CPT

## 2024-05-11 PROCEDURE — 80307 DRUG TEST PRSMV CHEM ANLYZR: CPT

## 2024-05-11 PROCEDURE — 84484 ASSAY OF TROPONIN QUANT: CPT

## 2024-05-11 PROCEDURE — 71045 X-RAY EXAM CHEST 1 VIEW: CPT

## 2024-05-11 PROCEDURE — 83880 ASSAY OF NATRIURETIC PEPTIDE: CPT

## 2024-05-11 PROCEDURE — 85025 COMPLETE CBC W/AUTO DIFF WBC: CPT

## 2024-05-11 PROCEDURE — 81003 URINALYSIS AUTO W/O SCOPE: CPT

## 2024-05-11 PROCEDURE — 80305 DRUG TEST PRSMV DIR OPT OBS: CPT

## 2024-05-11 PROCEDURE — 83605 ASSAY OF LACTIC ACID: CPT

## 2024-05-11 PROCEDURE — 93005 ELECTROCARDIOGRAM TRACING: CPT

## 2024-05-11 PROCEDURE — 80053 COMPREHEN METABOLIC PANEL: CPT

## 2024-05-11 PROCEDURE — 96374 THER/PROPH/DIAG INJ IV PUSH: CPT

## 2024-05-11 PROCEDURE — 99285 EMERGENCY DEPT VISIT HI MDM: CPT

## 2024-05-11 RX ADMIN — Medication PRN ML: at 14:19

## 2024-05-14 ENCOUNTER — HOSPITAL ENCOUNTER (EMERGENCY)
Dept: HOSPITAL 52 - LL.ED | Age: 59
Discharge: NURSING, REHAB OR LONG-TERM CARE | End: 2024-05-14
Payer: MEDICARE

## 2024-05-14 VITALS — SYSTOLIC BLOOD PRESSURE: 144 MMHG | HEART RATE: 110 BPM | DIASTOLIC BLOOD PRESSURE: 91 MMHG

## 2024-05-14 DIAGNOSIS — E03.9: ICD-10-CM

## 2024-05-14 DIAGNOSIS — R06.02: ICD-10-CM

## 2024-05-14 DIAGNOSIS — R79.89: ICD-10-CM

## 2024-05-14 DIAGNOSIS — J44.9: ICD-10-CM

## 2024-05-14 DIAGNOSIS — A41.9: Primary | ICD-10-CM

## 2024-05-14 DIAGNOSIS — Z88.8: ICD-10-CM

## 2024-05-14 DIAGNOSIS — R60.0: ICD-10-CM

## 2024-05-14 DIAGNOSIS — Z79.899: ICD-10-CM

## 2024-05-14 DIAGNOSIS — R00.0: ICD-10-CM

## 2024-05-14 DIAGNOSIS — E78.00: ICD-10-CM

## 2024-05-14 DIAGNOSIS — K21.9: ICD-10-CM

## 2024-05-14 DIAGNOSIS — I48.91: ICD-10-CM

## 2024-05-14 DIAGNOSIS — I10: ICD-10-CM

## 2024-05-14 DIAGNOSIS — Z90.49: ICD-10-CM

## 2024-05-14 DIAGNOSIS — Z79.890: ICD-10-CM

## 2024-05-14 LAB
ALBUMIN SERPL-MCNC: 2.7 G/DL (ref 3.4–5)
ALP SERPL-CCNC: 153 IU/L (ref 46–116)
ALT SERPL-CCNC: 41 U/L (ref 12–78)
AMPHET UR QL SCN: NEGATIVE
AMPHET UR QL SCN: NEGATIVE
ANION GAP SERPL CALC-SCNC: 11.2 MEQ/L (ref 7–15)
APPEARANCE UR: (no result)
AST SERPL-CCNC: 36 U/L (ref 15–37)
BARBITURATES UR QL SCN: NEGATIVE
BASOPHILS # BLD AUTO: 0 K/UL (ref 0–0.2)
BASOPHILS NFR BLD AUTO: 0 % (ref 0–2)
BENZODIAZ UR QL SCN: NEGATIVE
BILIRUB SERPL-MCNC: 0.5 MG/DL (ref 0.2–1)
BILIRUB UR STRIP-MCNC: NEGATIVE MG/DL
BNP SERPL-MCNC: 302 PG/ML (ref 0–125)
BUN SERPL-MCNC: 13 MG/DL (ref 7–18)
BUPRENORPHINE UR QL: NEGATIVE
CALCIUM SERPL-MCNC: 8.5 MG/DL (ref 8.5–10.1)
CHLORIDE SERPL-SCNC: 101 MMOL/L (ref 98–107)
CO2 SERPL-SCNC: 23.8 MMOL/L (ref 21–32)
COCAINE UR QL SCN: NEGATIVE
COLOR UR: (no result)
CREAT CL 24H UR+SERPL-VRATE: (no result) ML/MIN
CREAT SERPL-MCNC: 0.85 MG/DL (ref 0.51–1.17)
EDDP UR QL: NEGATIVE
EGFRCR SERPLBLD CKD-EPI 2021: 99 ML/MIN (ref 60–?)
EOSINOPHIL # BLD AUTO: 0.07 K/UL (ref 0–0.5)
EOSINOPHIL NFR BLD AUTO: 1.1 % (ref 0–5)
ERYTHROCYTE [DISTWIDTH] IN BLOOD BY AUTOMATED COUNT: 22.6 % (ref 11.2–14.1)
ETHANOL BLD-MCNC: 0 G/DL (ref 0–0.08)
FLUAV RNA UPPER RESP QL NAA+PROBE: NEGATIVE
FLUBV RNA UPPER RESP QL NAA+PROBE: NEGATIVE
GLUCOSE SERPL-MCNC: 198 MG/DL (ref 70–99)
GLUCOSE UR STRIP-MCNC: NEGATIVE MG/DL
HCT VFR BLD AUTO: 35.5 % (ref 39–49)
HGB BLD-MCNC: 11.6 G/DL (ref 13.1–16.8)
INR PPP: 1.1 (ref 0.9–1.1)
KETONES UR STRIP-MCNC: NEGATIVE MG/DL
LACTATE SERPL-SCNC: 1.9 MMOL/L (ref 0.4–2)
LYMPHOCYTES # BLD AUTO: 0.65 K/UL (ref 0.5–3.5)
LYMPHOCYTES NFR BLD AUTO: 10 % (ref 10–50)
MAGNESIUM SERPL-MCNC: 1.8 MG/DL (ref 1.8–2.4)
MCH RBC QN AUTO: 25 PG (ref 28.2–33.3)
MCHC RBC AUTO-ENTMCNC: 32.7 G/DL (ref 31.7–36)
MCHC RBC AUTO-ENTMCNC: 76.5 FL (ref 84–98)
MONOCYTES # BLD AUTO: 0.02 K/UL (ref 0–1)
MONOCYTES NFR BLD AUTO: 0.3 % (ref 2–14)
NEUTROPHILS # BLD AUTO: 5.75 K/UL (ref 1.4–7)
NEUTROPHILS NFR BLD AUTO: 88.6 % (ref 45–80)
NITRITE UR QL: NEGATIVE
OXYCODONE UR QL SCN: NEGATIVE
PH UR STRIP: 6.5 [PH] (ref 5–9)
PLATELET # BLD AUTO: 150 K/UL (ref 150–350)
POTASSIUM SERPL-SCNC: 3.5 MMOL/L (ref 3.5–5.1)
PROT SERPL-MCNC: 7.2 G/DL (ref 6.4–8.2)
PROT UR STRIP-MCNC: NEGATIVE MG/DL
PROTHROMBIN TIME: 10.5 SEC (ref 9–11.1)
RBC # BLD AUTO: 4.64 M/UL (ref 4.33–5.41)
RBC UR QL: NEGATIVE
RSV RNA UPPER RESP QL NAA+PROBE: NEGATIVE
SARS-COV-2 RNA RESP QL NAA+PROBE: NEGATIVE
SODIUM SERPL-SCNC: 136 MMOL/L (ref 136–145)
SP GR UR STRIP: 1.01 (ref 1–1.03)
TCA UR-MCNC: NEGATIVE UG/ML
THC UR QL SCN>50 NG/ML: POSITIVE
UROBILINOGEN UR STRIP-ACNC: 0.2 E.U./DL (ref 0.2–1)
WBC # BLD AUTO: 6.5 K/UL (ref 4–10.2)

## 2024-05-14 RX ADMIN — VANCOMYCIN ONE MLS/HR: 2 INJECTION, SOLUTION INTRAVENOUS at 12:27

## 2025-01-29 ENCOUNTER — HOSPITAL ENCOUNTER (INPATIENT)
Dept: HOSPITAL 52 - LL.ED | Age: 60
LOS: 2 days | Discharge: NURSING, REHAB OR LONG-TERM CARE | DRG: 300 | End: 2025-01-31
Attending: EMERGENCY MEDICINE | Admitting: EMERGENCY MEDICINE
Payer: MEDICARE

## 2025-01-29 DIAGNOSIS — E78.00: ICD-10-CM

## 2025-01-29 DIAGNOSIS — Z87.81: ICD-10-CM

## 2025-01-29 DIAGNOSIS — I10: ICD-10-CM

## 2025-01-29 DIAGNOSIS — Z98.890: ICD-10-CM

## 2025-01-29 DIAGNOSIS — J44.9: ICD-10-CM

## 2025-01-29 DIAGNOSIS — G62.9: ICD-10-CM

## 2025-01-29 DIAGNOSIS — Z88.8: ICD-10-CM

## 2025-01-29 DIAGNOSIS — K21.9: ICD-10-CM

## 2025-01-29 DIAGNOSIS — K59.09: ICD-10-CM

## 2025-01-29 DIAGNOSIS — Z79.1: ICD-10-CM

## 2025-01-29 DIAGNOSIS — Z79.51: ICD-10-CM

## 2025-01-29 DIAGNOSIS — M19.90: ICD-10-CM

## 2025-01-29 DIAGNOSIS — Z79.02: ICD-10-CM

## 2025-01-29 DIAGNOSIS — Z79.899: ICD-10-CM

## 2025-01-29 DIAGNOSIS — Z66: ICD-10-CM

## 2025-01-29 DIAGNOSIS — F32.A: ICD-10-CM

## 2025-01-29 DIAGNOSIS — I82.402: Primary | ICD-10-CM

## 2025-01-29 DIAGNOSIS — G89.29: ICD-10-CM

## 2025-01-29 DIAGNOSIS — F15.10: ICD-10-CM

## 2025-01-29 DIAGNOSIS — Z98.52: ICD-10-CM

## 2025-01-29 DIAGNOSIS — K58.9: ICD-10-CM

## 2025-01-29 DIAGNOSIS — Z96.651: ICD-10-CM

## 2025-01-29 DIAGNOSIS — K52.9: ICD-10-CM

## 2025-01-29 DIAGNOSIS — Z79.84: ICD-10-CM

## 2025-01-29 DIAGNOSIS — D84.9: ICD-10-CM

## 2025-01-29 DIAGNOSIS — Z90.49: ICD-10-CM

## 2025-01-29 DIAGNOSIS — F17.210: ICD-10-CM

## 2025-01-29 DIAGNOSIS — F41.9: ICD-10-CM

## 2025-01-29 DIAGNOSIS — I48.91: ICD-10-CM

## 2025-01-29 DIAGNOSIS — E03.9: ICD-10-CM

## 2025-01-29 DIAGNOSIS — Z79.82: ICD-10-CM

## 2025-01-29 DIAGNOSIS — I27.20: ICD-10-CM

## 2025-01-29 LAB
ALBUMIN SERPL-MCNC: 3.2 G/DL (ref 3.4–5)
ALP SERPL-CCNC: 128 IU/L (ref 46–116)
ALT SERPL-CCNC: 30 U/L (ref 12–78)
ANION GAP SERPL CALC-SCNC: 9.4 MEQ/L (ref 7–15)
AST SERPL-CCNC: 22 U/L (ref 15–37)
BASOPHILS # BLD AUTO: 0.01 K/UL (ref 0–0.2)
BASOPHILS NFR BLD AUTO: 0.1 % (ref 0–2)
BILIRUB SERPL-MCNC: 0.6 MG/DL (ref 0.2–1)
BUN SERPL-MCNC: 7 MG/DL (ref 7–18)
CALCIUM SERPL-MCNC: 9.3 MG/DL (ref 8.5–10.1)
CHLORIDE SERPL-SCNC: 107 MMOL/L (ref 98–107)
CO2 SERPL-SCNC: 27.6 MMOL/L (ref 21–32)
CREAT CL 24H UR+SERPL-VRATE: 83.57 ML/MIN
CREAT SERPL-MCNC: 0.94 MG/DL (ref 0.51–1.17)
EGFRCR SERPLBLD CKD-EPI 2021: 93 ML/MIN (ref 60–?)
EOSINOPHIL # BLD AUTO: 0.18 K/UL (ref 0–0.5)
EOSINOPHIL NFR BLD AUTO: 2.2 % (ref 0–5)
ERYTHROCYTE [DISTWIDTH] IN BLOOD BY AUTOMATED COUNT: 18.9 % (ref 11.2–14.1)
ETHANOL BLD-MCNC: 0 G/DL (ref 0–0.08)
GLUCOSE SERPL-MCNC: 109 MG/DL (ref 70–99)
HCT VFR BLD AUTO: 40 % (ref 39–49)
HGB BLD-MCNC: 12.9 G/DL (ref 13.1–16.8)
IMMATURE GRAN ABSOLUTE AUTO: 0.03 10^3/UL (ref 0–0.04)
IMMATURE GRAN PERCENT AUTO: 0.4 % (ref 0–0.4)
LYMPHOCYTES # BLD AUTO: 1.07 K/UL (ref 0.5–3.5)
LYMPHOCYTES NFR BLD AUTO: 13.2 % (ref 10–50)
MCH RBC QN AUTO: 26.9 PG (ref 28.2–33.3)
MCHC RBC AUTO-ENTMCNC: 32.3 G/DL (ref 31.7–36)
MCHC RBC AUTO-ENTMCNC: 83.5 FL (ref 84–98)
MONOCYTES # BLD AUTO: 0.18 K/UL (ref 0–1)
MONOCYTES NFR BLD AUTO: 2.2 % (ref 2–14)
NEUTROPHILS # BLD AUTO: 6.66 K/UL (ref 1.4–7)
NEUTROPHILS NFR BLD AUTO: 81.9 % (ref 45–80)
PLATELET # BLD AUTO: 273 K/UL (ref 150–350)
POTASSIUM SERPL-SCNC: 4.6 MMOL/L (ref 3.5–5.1)
PROT SERPL-MCNC: 7.5 G/DL (ref 6.4–8.2)
RBC # BLD AUTO: 4.79 M/UL (ref 4.33–5.41)
SODIUM SERPL-SCNC: 144 MMOL/L (ref 136–145)
WBC # BLD AUTO: 8.1 K/UL (ref 4–10.2)

## 2025-01-29 RX ADMIN — HEPARIN SODIUM ONE UNITS: 5000 INJECTION, SOLUTION INTRAVENOUS; SUBCUTANEOUS at 17:52

## 2025-01-29 RX ADMIN — HEPARIN SODIUM SCH MLS/HR: 5000 INJECTION, SOLUTION INTRAVENOUS at 17:57

## 2025-01-30 RX ADMIN — TRIAMCINOLONE ACETONIDE PRN APPLIC: 1 CREAM TOPICAL at 22:34

## 2025-01-30 RX ADMIN — HEPARIN SODIUM ONE UNITS: 5000 INJECTION, SOLUTION INTRAVENOUS; SUBCUTANEOUS at 14:34

## 2025-01-30 RX ADMIN — POTASSIUM CHLORIDE SCH: 1500 TABLET, EXTENDED RELEASE ORAL at 08:42

## 2025-01-30 RX ADMIN — HEPARIN SODIUM ONE UNITS: 5000 INJECTION, SOLUTION INTRAVENOUS; SUBCUTANEOUS at 08:14

## 2025-01-31 VITALS — HEART RATE: 85 BPM | DIASTOLIC BLOOD PRESSURE: 87 MMHG | SYSTOLIC BLOOD PRESSURE: 143 MMHG

## 2025-01-31 LAB
ANION GAP SERPL CALC-SCNC: 5.9 MEQ/L (ref 7–15)
BASOPHILS # BLD AUTO: 0.01 K/UL (ref 0–0.2)
BASOPHILS NFR BLD AUTO: 0.2 % (ref 0–2)
BUN SERPL-MCNC: 5 MG/DL (ref 7–18)
CALCIUM SERPL-MCNC: 8.7 MG/DL (ref 8.5–10.1)
CHLORIDE SERPL-SCNC: 106 MMOL/L (ref 98–107)
CO2 SERPL-SCNC: 27.1 MMOL/L (ref 21–32)
CREAT CL 24H UR+SERPL-VRATE: 96.98 ML/MIN
CREAT SERPL-MCNC: 0.81 MG/DL (ref 0.51–1.17)
EGFRCR SERPLBLD CKD-EPI 2021: 101 ML/MIN (ref 60–?)
EOSINOPHIL # BLD AUTO: 0.18 K/UL (ref 0–0.5)
EOSINOPHIL NFR BLD AUTO: 3.2 % (ref 0–5)
ERYTHROCYTE [DISTWIDTH] IN BLOOD BY AUTOMATED COUNT: 18.3 % (ref 11.2–14.1)
GLUCOSE SERPL-MCNC: 141 MG/DL (ref 70–99)
HCT VFR BLD AUTO: 35.2 % (ref 39–49)
HGB BLD-MCNC: 11.6 G/DL (ref 13.1–16.8)
IMMATURE GRAN ABSOLUTE AUTO: 0.01 10^3/UL (ref 0–0.04)
IMMATURE GRAN PERCENT AUTO: 0.2 % (ref 0–0.4)
LYMPHOCYTES # BLD AUTO: 0.74 K/UL (ref 0.5–3.5)
LYMPHOCYTES NFR BLD AUTO: 13.1 % (ref 10–50)
MCH RBC QN AUTO: 27.1 PG (ref 28.2–33.3)
MCHC RBC AUTO-ENTMCNC: 33 G/DL (ref 31.7–36)
MCHC RBC AUTO-ENTMCNC: 82.2 FL (ref 84–98)
MONOCYTES # BLD AUTO: 0.1 K/UL (ref 0–1)
MONOCYTES NFR BLD AUTO: 1.8 % (ref 2–14)
NEUTROPHILS # BLD AUTO: 4.63 K/UL (ref 1.4–7)
NEUTROPHILS NFR BLD AUTO: 81.5 % (ref 45–80)
PLATELET # BLD AUTO: 228 K/UL (ref 150–350)
POTASSIUM SERPL-SCNC: 3.7 MMOL/L (ref 3.5–5.1)
RBC # BLD AUTO: 4.28 M/UL (ref 4.33–5.41)
SODIUM SERPL-SCNC: 139 MMOL/L (ref 136–145)
WBC # BLD AUTO: 5.7 K/UL (ref 4–10.2)

## 2025-01-31 RX ADMIN — HEPARIN SODIUM ONE UNITS: 5000 INJECTION, SOLUTION INTRAVENOUS; SUBCUTANEOUS at 01:06
